# Patient Record
Sex: FEMALE | Race: WHITE | Employment: OTHER | URBAN - METROPOLITAN AREA
[De-identification: names, ages, dates, MRNs, and addresses within clinical notes are randomized per-mention and may not be internally consistent; named-entity substitution may affect disease eponyms.]

---

## 2018-09-30 ENCOUNTER — HOSPITAL ENCOUNTER (EMERGENCY)
Age: 58
Discharge: HOME | End: 2018-09-30
Attending: EMERGENCY MEDICINE
Payer: COMMERCIAL

## 2018-09-30 ENCOUNTER — APPOINTMENT (OUTPATIENT)
Dept: GENERAL RADIOLOGY | Age: 58
End: 2018-09-30
Attending: EMERGENCY MEDICINE
Payer: COMMERCIAL

## 2018-09-30 VITALS
WEIGHT: 190.26 LBS | SYSTOLIC BLOOD PRESSURE: 141 MMHG | OXYGEN SATURATION: 97 % | TEMPERATURE: 98.4 F | BODY MASS INDEX: 35.92 KG/M2 | RESPIRATION RATE: 20 BRPM | HEIGHT: 61 IN | HEART RATE: 85 BPM | DIASTOLIC BLOOD PRESSURE: 79 MMHG

## 2018-09-30 DIAGNOSIS — M54.41 ACUTE RIGHT-SIDED LOW BACK PAIN WITH RIGHT-SIDED SCIATICA: Primary | ICD-10-CM

## 2018-09-30 DIAGNOSIS — M62.838 MUSCLE SPASM: ICD-10-CM

## 2018-09-30 PROCEDURE — 72100 X-RAY EXAM L-S SPINE 2/3 VWS: CPT

## 2018-09-30 PROCEDURE — 99283 EMERGENCY DEPT VISIT LOW MDM: CPT

## 2018-09-30 PROCEDURE — 96372 THER/PROPH/DIAG INJ SC/IM: CPT

## 2018-09-30 PROCEDURE — 74011250636 HC RX REV CODE- 250/636: Performed by: EMERGENCY MEDICINE

## 2018-09-30 RX ORDER — KETOROLAC TROMETHAMINE 30 MG/ML
30 INJECTION, SOLUTION INTRAMUSCULAR; INTRAVENOUS
Status: COMPLETED | OUTPATIENT
Start: 2018-09-30 | End: 2018-09-30

## 2018-09-30 RX ORDER — SIMVASTATIN 20 MG/1
20 TABLET, FILM COATED ORAL
COMMUNITY
End: 2019-08-19 | Stop reason: SDUPTHER

## 2018-09-30 RX ORDER — TRAMADOL HYDROCHLORIDE 50 MG/1
50 TABLET ORAL
COMMUNITY
End: 2018-09-30

## 2018-09-30 RX ORDER — CYCLOBENZAPRINE HCL 10 MG
10 TABLET ORAL
Qty: 15 TAB | Refills: 0 | Status: SHIPPED | OUTPATIENT
Start: 2018-09-30 | End: 2019-04-17 | Stop reason: ALTCHOICE

## 2018-09-30 RX ORDER — IBUPROFEN 200 MG
1 TABLET ORAL EVERY 24 HOURS
COMMUNITY

## 2018-09-30 RX ORDER — METFORMIN HYDROCHLORIDE 750 MG/1
1500 TABLET, EXTENDED RELEASE ORAL DAILY
COMMUNITY
End: 2019-11-18 | Stop reason: SDUPTHER

## 2018-09-30 RX ADMIN — KETOROLAC TROMETHAMINE 30 MG: 30 INJECTION, SOLUTION INTRAMUSCULAR at 17:26

## 2018-09-30 NOTE — DISCHARGE INSTRUCTIONS
Back Pain: Care Instructions  Your Care Instructions    Back pain has many possible causes. It is often related to problems with muscles and ligaments of the back. It may also be related to problems with the nerves, discs, or bones of the back. Moving, lifting, standing, sitting, or sleeping in an awkward way can strain the back. Sometimes you don't notice the injury until later. Arthritis is another common cause of back pain. Although it may hurt a lot, back pain usually improves on its own within several weeks. Most people recover in 12 weeks or less. Using good home treatment and being careful not to stress your back can help you feel better sooner. Follow-up care is a key part of your treatment and safety. Be sure to make and go to all appointments, and call your doctor if you are having problems. It's also a good idea to know your test results and keep a list of the medicines you take. How can you care for yourself at home? · Sit or lie in positions that are most comfortable and reduce your pain. Try one of these positions when you lie down:  ¨ Lie on your back with your knees bent and supported by large pillows. ¨ Lie on the floor with your legs on the seat of a sofa or chair. Tracy Sandhoff on your side with your knees and hips bent and a pillow between your legs. ¨ Lie on your stomach if it does not make pain worse. · Do not sit up in bed, and avoid soft couches and twisted positions. Bed rest can help relieve pain at first, but it delays healing. Avoid bed rest after the first day of back pain. · Change positions every 30 minutes. If you must sit for long periods of time, take breaks from sitting. Get up and walk around, or lie in a comfortable position. · Try using a heating pad on a low or medium setting for 15 to 20 minutes every 2 or 3 hours. Try a warm shower in place of one session with the heating pad. · You can also try an ice pack for 10 to 15 minutes every 2 to 3 hours.  Put a thin cloth between the ice pack and your skin. · Take pain medicines exactly as directed. ¨ If the doctor gave you a prescription medicine for pain, take it as prescribed. ¨ If you are not taking a prescription pain medicine, ask your doctor if you can take an over-the-counter medicine. · Take short walks several times a day. You can start with 5 to 10 minutes, 3 or 4 times a day, and work up to longer walks. Walk on level surfaces and avoid hills and stairs until your back is better. · Return to work and other activities as soon as you can. Continued rest without activity is usually not good for your back. · To prevent future back pain, do exercises to stretch and strengthen your back and stomach. Learn how to use good posture, safe lifting techniques, and proper body mechanics. When should you call for help? Call your doctor now or seek immediate medical care if:    · You have new or worsening numbness in your legs.     · You have new or worsening weakness in your legs. (This could make it hard to stand up.)     · You lose control of your bladder or bowels.    Watch closely for changes in your health, and be sure to contact your doctor if:    · You have a fever, lose weight, or don't feel well.     · You do not get better as expected. Where can you learn more? Go to http://gopi-felicia.info/. Enter C465 in the search box to learn more about \"Back Pain: Care Instructions. \"  Current as of: November 29, 2017  Content Version: 11.7  © 1040-8123 [employer]. Care instructions adapted under license by [employer] (which disclaims liability or warranty for this information). If you have questions about a medical condition or this instruction, always ask your healthcare professional. [place] [address] any warranty or liability for your use of this information.

## 2018-09-30 NOTE — ED PROVIDER NOTES
HPI Comments: Pt. Presents to the ER with complaints of back pain. Pt. Has a history of recurrent back pain. Pt. Lifted a heavy bag of grass seed three days ago. Shortly afterwards, pt. Developed right sided low back pain that radiates down her right leg. Pt. Has taken ultram with minimal relief. Pt. Denies numbness/tingling or incontinence of urine or stool. A few times, pt. Was unable to get to the bathroom in time and urinated on herself. However, she states that this was because her back was spasming and she wasn't able to walk well. Pt. Says that she is pain free at rest, but her back spasms with minimal movement. No falls. No trauma. No other complaints. Patient is a 60 y.o. female presenting with back pain. Back Pain Pertinent negatives include no chest pain, no fever, no abdominal pain, no dysuria and no weakness. Past Medical History:  
Diagnosis Date  Asthma  Reyes's esophagus  Diabetes ([facility] [address]  Macular degeneration, right eye  Psychiatric disorder   
 depression Past Surgical History:  
Procedure Laterality Date  HX CHOLECYSTECTOMY  HX HEENT    
 tonsils History reviewed. No pertinent family history. Social History Social History  Marital status: N/A Spouse name: N/A  
 Number of children: N/A  
 Years of education: N/A Occupational History  Not on file. Social History Main Topics  Smoking status: Former Smoker  Smokeless tobacco: Never Used  Alcohol use Yes Comment: occ  Drug use: No  
 Sexual activity: Not on file Other Topics Concern  Not on file Social History Narrative  No narrative on file ALLERGIES: Vioxx [rofecoxib] Review of Systems Constitutional: Negative for chills and fever. HENT: Negative for rhinorrhea and sore throat. Respiratory: Negative for cough and shortness of breath. Cardiovascular: Negative for chest pain. Gastrointestinal: Negative for abdominal pain, diarrhea, nausea and vomiting. Genitourinary: Negative for dysuria and urgency. Musculoskeletal: Positive for back pain. Negative for arthralgias. Skin: Negative for rash. Neurological: Negative for dizziness, weakness and light-headedness. Vitals:  
 09/30/18 1708 BP: 141/79 Pulse: 85 Resp: 20 Temp: 98.4 °F (36.9 °C) SpO2: 97% Weight: 86.3 kg (190 lb 4.1 oz) Height: 5' 1\" (1.549 m) Physical Exam  
 
Vital signs reviewed. Nursing notes reviewed. Const:  No acute distress, well developed, well nourished Head:  Atraumatic, normocephalic Eyes:  PERRL, conjunctiva normal, no scleral icterus Neck:  Supple, trachea midline Cardiovascular:  RRR, no murmurs, no gallops, no rubs Resp:  No resp distress, no increased work of breathing, no wheezes, no rhonchi, no rales, Abd:  Soft, non-tender, non-distended, no rebound, no guarding, no CVA tenderness :  Deferred MSK:  No pedal edema, no midline C, T or L spine tenderness, positive straight leg raise on the right, lifting the left leg causes worse pain in her back Neuro:  Alert and oriented x3, no cranial nerve defect Skin:  Warm, dry, intact Psych: normal mood and affect, behavior is normal, judgement and thought content is normal 
 
 
 
MDM Number of Diagnoses or Management Options Acute right-sided low back pain with right-sided sciatica:  
Muscle spasm:  
  
Amount and/or Complexity of Data Reviewed Tests in the radiology section of CPT®: ordered and reviewed Review and summarize past medical records: yes Patient Progress Patient progress: stable ED Course Pt. Presents to the ER with complaints of low back pain/spasms. No signs/sx of cord compression. No fx on xray. I will start her on flexeril. Pt. To f/u with her PCP or return to the ER with worsening sx. Procedures

## 2018-09-30 NOTE — ED TRIAGE NOTES
Pt. States her back started hurting on Friday but today is the worse. PT. Has hx. Of back issues and \"goes out on her every now and then\".

## 2019-04-17 ENCOUNTER — OFFICE VISIT (OUTPATIENT)
Dept: PRIMARY CARE CLINIC | Age: 59
End: 2019-04-17

## 2019-04-17 VITALS
HEART RATE: 89 BPM | WEIGHT: 175.6 LBS | DIASTOLIC BLOOD PRESSURE: 86 MMHG | OXYGEN SATURATION: 99 % | BODY MASS INDEX: 33.15 KG/M2 | TEMPERATURE: 98.5 F | RESPIRATION RATE: 16 BRPM | SYSTOLIC BLOOD PRESSURE: 129 MMHG | HEIGHT: 61 IN

## 2019-04-17 DIAGNOSIS — M54.9 OTHER ACUTE BACK PAIN: ICD-10-CM

## 2019-04-17 DIAGNOSIS — D64.9 ANEMIA, UNSPECIFIED TYPE: ICD-10-CM

## 2019-04-17 DIAGNOSIS — F32.1 CURRENT MODERATE EPISODE OF MAJOR DEPRESSIVE DISORDER, UNSPECIFIED WHETHER RECURRENT (HCC): ICD-10-CM

## 2019-04-17 DIAGNOSIS — K22.70 BARRETT'S ESOPHAGUS WITHOUT DYSPLASIA: ICD-10-CM

## 2019-04-17 DIAGNOSIS — G62.9 NEUROPATHY: ICD-10-CM

## 2019-04-17 DIAGNOSIS — E11.9 CONTROLLED TYPE 2 DIABETES MELLITUS WITHOUT COMPLICATION, WITHOUT LONG-TERM CURRENT USE OF INSULIN (HCC): Primary | ICD-10-CM

## 2019-04-17 DIAGNOSIS — E53.8 VITAMIN B12 DEFICIENCY: ICD-10-CM

## 2019-04-17 DIAGNOSIS — F43.10 PTSD (POST-TRAUMATIC STRESS DISORDER): ICD-10-CM

## 2019-04-17 DIAGNOSIS — B00.9 HERPES SIMPLEX TYPE 1 INFECTION: ICD-10-CM

## 2019-04-17 DIAGNOSIS — F51.01 PRIMARY INSOMNIA: ICD-10-CM

## 2019-04-17 DIAGNOSIS — E78.00 HYPERCHOLESTEREMIA: ICD-10-CM

## 2019-04-17 DIAGNOSIS — E11.9 TYPE 2 DIABETES MELLITUS WITHOUT COMPLICATION, WITHOUT LONG-TERM CURRENT USE OF INSULIN (HCC): ICD-10-CM

## 2019-04-17 LAB — HBA1C MFR BLD HPLC: 8.7 %

## 2019-04-17 RX ORDER — ALBUTEROL SULFATE 90 UG/1
AEROSOL, METERED RESPIRATORY (INHALATION)
Refills: 2 | COMMUNITY
Start: 2019-04-03 | End: 2019-11-25 | Stop reason: SDUPTHER

## 2019-04-17 RX ORDER — ESCITALOPRAM OXALATE 20 MG/1
TABLET ORAL
COMMUNITY
End: 2019-04-17

## 2019-04-17 RX ORDER — TRAZODONE HYDROCHLORIDE 50 MG/1
TABLET ORAL
Refills: 0 | COMMUNITY
Start: 2019-03-26 | End: 2019-08-19 | Stop reason: SDUPTHER

## 2019-04-17 RX ORDER — METHYLPHENIDATE HYDROCHLORIDE 20 MG/1
TABLET ORAL
Refills: 0 | COMMUNITY
Start: 2019-02-14 | End: 2019-06-24 | Stop reason: DRUGHIGH

## 2019-04-17 RX ORDER — ESOMEPRAZOLE MAGNESIUM 40 MG/1
CAPSULE, DELAYED RELEASE ORAL
Refills: 3 | COMMUNITY
Start: 2019-03-18

## 2019-04-17 RX ORDER — CYANOCOBALAMIN 1000 UG/ML
INJECTION, SOLUTION INTRAMUSCULAR; SUBCUTANEOUS
Refills: 0 | COMMUNITY
Start: 2019-02-11 | End: 2019-07-03 | Stop reason: SDUPTHER

## 2019-04-17 RX ORDER — CYCLOBENZAPRINE HCL 10 MG
10 TABLET ORAL
Qty: 30 TAB | Refills: 0 | Status: SHIPPED | OUTPATIENT
Start: 2019-04-17 | End: 2019-04-22 | Stop reason: SDUPTHER

## 2019-04-17 RX ORDER — VALACYCLOVIR HYDROCHLORIDE 500 MG/1
TABLET, FILM COATED ORAL AS NEEDED
Refills: 3 | COMMUNITY
Start: 2019-03-14

## 2019-04-17 RX ORDER — DULOXETIN HYDROCHLORIDE 60 MG/1
60 CAPSULE, DELAYED RELEASE ORAL DAILY
Qty: 30 CAP | Refills: 3 | Status: SHIPPED | OUTPATIENT
Start: 2019-04-17 | End: 2019-08-19

## 2019-04-17 RX ORDER — PROGESTERONE 200 MG/1
CAPSULE ORAL
Refills: 3 | COMMUNITY
Start: 2019-02-05 | End: 2019-10-31 | Stop reason: ALTCHOICE

## 2019-04-17 RX ORDER — NEEDLES, FILTER 19GX1 1/2"
NEEDLE, DISPOSABLE MISCELLANEOUS
Refills: 3 | COMMUNITY
Start: 2019-02-11

## 2019-04-17 NOTE — PROGRESS NOTES
Chief Complaint Patient presents with [facility]  Diabetes  
  patient states she thinks she is getting neuropathy in both of her feet, has cold feet on and off, numbness, tingly, and sharp pricks in her feet. Diabetes runs in her family and  her sister got BI AKA 1. Have you been to the ER, urgent care clinic since your last visit? Hospitalized since your last visit? No 
 
2. Have you seen or consulted any other health care providers outside of the [address] [place] [address] since your last visit? Include any pap smears or colon screening.  No
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
## 2019-04-18 LAB
ALBUMIN SERPL-MCNC: 4.5 G/DL (ref 3.5–5.5)
ALBUMIN/CREAT UR: 6.5 MG/G CREAT (ref 0–30)
ALBUMIN/GLOB SERPL: 1.8 {RATIO} (ref 1.2–2.2)
ALP SERPL-CCNC: 127 IU/L (ref 39–117)
ALT SERPL-CCNC: 27 IU/L (ref 0–32)
APPEARANCE UR: ABNORMAL
AST SERPL-CCNC: 14 IU/L (ref 0–40)
BASOPHILS # BLD AUTO: 0 X10E3/UL (ref 0–0.2)
BASOPHILS NFR BLD AUTO: 0 %
BILIRUB SERPL-MCNC: 0.2 MG/DL (ref 0–1.2)
BILIRUB UR QL STRIP: NEGATIVE
BUN SERPL-MCNC: 12 MG/DL (ref 6–24)
BUN/CREAT SERPL: 20 (ref 9–23)
CALCIUM SERPL-MCNC: 8.8 MG/DL (ref 8.7–10.2)
CHLORIDE SERPL-SCNC: 103 MMOL/L (ref 96–106)
CHOLEST SERPL-MCNC: 146 MG/DL (ref 100–199)
CO2 SERPL-SCNC: 17 MMOL/L (ref 20–29)
COLOR UR: YELLOW
CREAT SERPL-MCNC: 0.59 MG/DL (ref 0.57–1)
CREAT UR-MCNC: 102.3 MG/DL
EOSINOPHIL # BLD AUTO: 0.4 X10E3/UL (ref 0–0.4)
EOSINOPHIL NFR BLD AUTO: 4 %
ERYTHROCYTE [DISTWIDTH] IN BLOOD BY AUTOMATED COUNT: 13.8 % (ref 12.3–15.4)
FERRITIN SERPL-MCNC: 27 NG/ML (ref 15–150)
FOLATE SERPL-MCNC: >20 NG/ML
GLOBULIN SER CALC-MCNC: 2.5 G/DL (ref 1.5–4.5)
GLUCOSE SERPL-MCNC: 183 MG/DL (ref 65–99)
GLUCOSE UR QL: ABNORMAL
HCT VFR BLD AUTO: 46.3 % (ref 34–46.6)
HDLC SERPL-MCNC: 34 MG/DL
HGB BLD-MCNC: 15.6 G/DL (ref 11.1–15.9)
HGB UR QL STRIP: NEGATIVE
IMM GRANULOCYTES # BLD AUTO: 0.1 X10E3/UL (ref 0–0.1)
IMM GRANULOCYTES NFR BLD AUTO: 1 %
IRON SATN MFR SERPL: 18 % (ref 15–55)
IRON SERPL-MCNC: 63 UG/DL (ref 27–159)
KETONES UR QL STRIP: NEGATIVE
LDLC SERPL CALC-MCNC: 86 MG/DL (ref 0–99)
LEUKOCYTE ESTERASE UR QL STRIP: NEGATIVE
LYMPHOCYTES # BLD AUTO: 2.3 X10E3/UL (ref 0.7–3.1)
LYMPHOCYTES NFR BLD AUTO: 27 %
MCH RBC QN AUTO: 30.1 PG (ref 26.6–33)
MCHC RBC AUTO-ENTMCNC: 33.7 G/DL (ref 31.5–35.7)
MCV RBC AUTO: 89 FL (ref 79–97)
MICRO URNS: ABNORMAL
MICROALBUMIN UR-MCNC: 6.6 UG/ML
MONOCYTES # BLD AUTO: 0.5 X10E3/UL (ref 0.1–0.9)
MONOCYTES NFR BLD AUTO: 6 %
NEUTROPHILS # BLD AUTO: 5.5 X10E3/UL (ref 1.4–7)
NEUTROPHILS NFR BLD AUTO: 62 %
NITRITE UR QL STRIP: NEGATIVE
PH UR STRIP: 5.5 [PH] (ref 5–7.5)
PLATELET # BLD AUTO: 253 X10E3/UL (ref 150–379)
POTASSIUM SERPL-SCNC: 4.3 MMOL/L (ref 3.5–5.2)
PROT SERPL-MCNC: 7 G/DL (ref 6–8.5)
PROT UR QL STRIP: NEGATIVE
RBC # BLD AUTO: 5.19 X10E6/UL (ref 3.77–5.28)
SODIUM SERPL-SCNC: 137 MMOL/L (ref 134–144)
SP GR UR: >=1.03 (ref 1–1.03)
TIBC SERPL-MCNC: 356 UG/DL (ref 250–450)
TRIGL SERPL-MCNC: 128 MG/DL (ref 0–149)
TSH SERPL DL<=0.005 MIU/L-ACNC: 2.07 UIU/ML (ref 0.45–4.5)
UIBC SERPL-MCNC: 293 UG/DL (ref 131–425)
UROBILINOGEN UR STRIP-MCNC: 0.2 MG/DL (ref 0.2–1)
VIT B12 SERPL-MCNC: 835 PG/ML (ref 232–1245)
VLDLC SERPL CALC-MCNC: 26 MG/DL (ref 5–40)
WBC # BLD AUTO: 8.8 X10E3/UL (ref 3.4–10.8)

## 2019-04-19 DIAGNOSIS — M54.9 OTHER ACUTE BACK PAIN: ICD-10-CM

## 2019-04-19 DIAGNOSIS — E11.9 TYPE 2 DIABETES MELLITUS WITHOUT COMPLICATION, WITHOUT LONG-TERM CURRENT USE OF INSULIN (HCC): ICD-10-CM

## 2019-04-19 NOTE — TELEPHONE ENCOUNTER
Pt came in and trulicity was supposed to be upped in dose but instead the rx was written for the normal dose. Would like this changed and sent to the pharmacy.

## 2019-04-22 RX ORDER — CYCLOBENZAPRINE HCL 10 MG
10 TABLET ORAL
Qty: 30 TAB | Refills: 0 | Status: SHIPPED | OUTPATIENT
Start: 2019-04-22 | End: 2019-04-29 | Stop reason: SDUPTHER

## 2019-04-22 NOTE — TELEPHONE ENCOUNTER
Called the pharmacist at [facility], he said the insurance is asking that we go to [facility] if possible, as it will cost the patient less money when she fills it. Her Trulicity is able to be filled but it will just be more expensive. Let me know what you would like to do Dr. Tracy Wen and I will call the pharmacist back and let him know.

## 2019-04-22 NOTE — TELEPHONE ENCOUNTER
Called and spoke with patient. Patient is stating that insurance is saying it is too soon to fill her prescription. I let patient know I would call the pharmacy and see what was going on. Patient thanked me for the call.

## 2019-04-22 NOTE — TELEPHONE ENCOUNTER
victoza is once daily inj. Pt has option of Trulcitiy, Ozempic or Bydureon once weekly. We can check with pharmacy if of of them is covered and how much it would cost more. I dont think [facility] is a good option.

## 2019-04-22 NOTE — TELEPHONE ENCOUNTER
I called and spoke to patient who states she has been on the Trulicity for 2 years and likes it so she would prefer to stay on it. Patient is not happy with [facility] so she would like these medications to go to [facility] instead of [facility]. I have pend them over to you.

## 2019-04-22 NOTE — TELEPHONE ENCOUNTER
When I spoke with him he did say that [address] were also on the list but he suggested Victoza. Would you like to try either one of those?

## 2019-04-26 ENCOUNTER — OFFICE VISIT (OUTPATIENT)
Dept: PRIMARY CARE CLINIC | Age: 59
End: 2019-04-26

## 2019-04-26 VITALS
DIASTOLIC BLOOD PRESSURE: 86 MMHG | HEART RATE: 90 BPM | RESPIRATION RATE: 17 BRPM | WEIGHT: 176 LBS | TEMPERATURE: 98.5 F | BODY MASS INDEX: 33.23 KG/M2 | SYSTOLIC BLOOD PRESSURE: 135 MMHG | OXYGEN SATURATION: 98 % | HEIGHT: 61 IN

## 2019-04-26 DIAGNOSIS — E11.9 TYPE 2 DIABETES MELLITUS WITHOUT COMPLICATION, WITHOUT LONG-TERM CURRENT USE OF INSULIN (HCC): ICD-10-CM

## 2019-04-26 DIAGNOSIS — G89.29 CHRONIC SI JOINT PAIN: Primary | ICD-10-CM

## 2019-04-26 DIAGNOSIS — M25.551 PAIN OF RIGHT HIP JOINT: ICD-10-CM

## 2019-04-26 DIAGNOSIS — M53.3 CHRONIC SI JOINT PAIN: Primary | ICD-10-CM

## 2019-04-26 RX ORDER — TRAMADOL HYDROCHLORIDE 50 MG/1
25 TABLET ORAL
COMMUNITY

## 2019-04-26 RX ORDER — IBUPROFEN 800 MG/1
800 TABLET ORAL
Qty: 30 TAB | Refills: 2 | Status: SHIPPED | OUTPATIENT
Start: 2019-04-26 | End: 2019-12-16 | Stop reason: SDUPTHER

## 2019-04-26 RX ORDER — CYCLOBENZAPRINE HCL 10 MG
10 TABLET ORAL
Qty: 30 TAB | Refills: 1 | Status: SHIPPED | OUTPATIENT
Start: 2019-04-26 | End: 2019-10-31 | Stop reason: ALTCHOICE

## 2019-04-26 NOTE — PROGRESS NOTES
Mario Partida is a 60 y.o.  female and presents with Chief Complaint Patient presents with  Back Pain  
  x 3 wks, getting worst  
 Diabetes Patient is here as a walk-in for back pain that started 3 weeks ago after she did some yard work. She thought that the pain was getting better but it seems to be coming back. She says she had similar pain in the lower back in September when she had to be brought into the ER. By ambulance. The pain is located more on the right side of the lower back also inner aspect of her hip hurts on the right side. She took the Flexeril and it helps her some. She does not want to take any opioids as it causes side effects. Lore Poe She is using a cane. She has a sleep number bed at home and she says she increase the pressure to 80 to make it firm. Past Medical History:  
Diagnosis Date  Asthma  Reyes's esophagus  Diabetes ([address]  Macular degeneration, right eye  Psychiatric disorder   
 depression Past Surgical History:  
Procedure Laterality Date  HX CHOLECYSTECTOMY  HX HEENT    
 tonsils Current Outpatient Medications Medication Sig  
 traMADol (ULTRAM) 50 mg tablet Take 50 mg by mouth every six (6) hours as needed for Pain.  ibuprofen (MOTRIN) 800 mg tablet Take 1 Tab by mouth every eight (8) hours as needed for Pain.  cyclobenzaprine (FLEXERIL) 10 mg tablet Take 1 Tab by mouth three (3) times daily as needed for Muscle Spasm(s).  dulaglutide (TRULICITY) 1.5 JK/0.4 mL sub-q pen 0.5 mL by SubCUTAneous route every seven (7) days.  cyclobenzaprine (FLEXERIL) 10 mg tablet Take 1 Tab by mouth three (3) times daily as needed for Muscle Spasm(s).  linagliptin (TRADJENTA) 5 mg tablet Take 1 Tab by mouth daily.   
 traZODone (DESYREL) 50 mg tablet TAKE 1/2 TABLET BY MOUTH EVERY DAY AT BEDTIME  
 NEXIUM 40 mg capsule TAKE 1 CAPSULE BY MOUTH TWICE A DAY  
  cyanocobalamin (VITAMIN B12) 1,000 mcg/mL injection INJECT 1 ML INTRAMUSCULARRLY EVERY WEEK  
 methylphenidate HCl (RITALIN) 20 mg tablet TAKE 1 TABLET BY MOUTH THREE TIMES A DAY  TESTOSTERONE, BULK, testosterone  VENTOLIN HFA 90 mcg/actuation inhaler INHALE 2 PUFFS EVERY 4 HOURS AS NEEDED FOR WHEEZING  progesterone (PROMETRIUM) 200 mg capsule TAKE 1 CAPSULE (200 MG) BY ORAL ROUTE ONCE DAILY IN THE EVENING  
 valACYclovir (VALTREX) 500 mg tablet TAKE 1 TABLET BY MOUTH THREE TIMES A DAY  DULoxetine (CYMBALTA) 60 mg capsule Take 1 Cap by mouth daily.  simvastatin (ZOCOR) 20 mg tablet Take 20 mg by mouth nightly.  metFORMIN ER (GLUCOPHAGE XR) 750 mg tablet Take 750 mg by mouth daily. 3 tabs once a day  BD INTEGRA SYRINGE 3 mL 25 gauge x 1\" syrg USE WITH VITAMIN B12 INJECTION WEEKLY  nicotine (NICODERM CQ) 21 mg/24 hr 1 Patch by TransDERmal route every twenty-four (24) hours. No current facility-administered medications for this visit. Health Maintenance Topic Date Due  
 Hepatitis C Screening  1960  Pneumococcal 0-64 years (1 of 1 - PPSV23) 09/01/1965  
 FOOT EXAM Q1  09/01/1969  
 EYE EXAM RETINAL OR DILATED  09/01/1969  
 DTaP/Tdap/Td series (1 - Tdap) 09/01/1980  Shingrix Vaccine Age 50> (1 of 2) 09/01/2009  FOBT Q 1 YEAR AGE 50-75  09/01/2009  Influenza Age 5 to Adult  08/01/2019  
 HEMOGLOBIN A1C Q6M  10/17/2019  MICROALBUMIN Q1  04/17/2020  LIPID PANEL Q1  04/17/2020  BREAST CANCER SCRN MAMMOGRAM  03/05/2021  PAP AKA CERVICAL CYTOLOGY  01/23/2022 There is no immunization history on file for this patient. No LMP recorded. Patient is postmenopausal. 
 
 
 
Allergies and Intolerances: Allergies Allergen Reactions  Vioxx [Rofecoxib] Other (comments) Family History:  
History reviewed. No pertinent family history. Social History: She  reports that she has quit smoking.  She has never used smokeless tobacco.  She  reports that she drinks alcohol. Review of Systems:  
General: negative for - chills, fatigue, fever, weight change Psych: negative for - anxiety, depression, irritability or mood swings ENT: negative for - headaches, hearing change, nasal congestion, oral lesions, sneezing or sore throat Heme/ Lymph: negative for - bleeding problems, bruising, pallor or swollen lymph nodes Endo: negative for - hot flashes, polydipsia/polyuria or temperature intolerance Resp: negative for - cough, shortness of breath or wheezing CV: negative for - chest pain, edema or palpitations GI: negative for - abdominal pain, change in bowel habits, constipation, diarrhea or nausea/vomiting : negative for - dysuria, hematuria, incontinence, pelvic pain or vulvar/vaginal symptoms MSK: negative for - joint pain, joint swelling or muscle pain, positive for lower back pain Neuro: negative for - confusion, headaches, seizures or weakness Derm: negative for - dry skin, hair changes, rash or skin lesion changes Physical:  
Vitals:  
Vitals:  
 04/26/19 1405 BP: 135/86 Pulse: 90 Resp: 17 Temp: 98.5 °F (36.9 °C) TempSrc: Oral  
SpO2: 98% Weight: 176 lb (79.8 kg) Height: 5' 1\" (1.549 m) Exam:  
HEENT- atraumatic,normocephalic, awake, oriented, well nourished Neck - supple,no enlarged lymph nodes, no JVD, no thyromegaly Chest- CTA, no rhonchi, no crackles Heart- rrr, no murmurs / gallop/rub Abdomen- soft,BS+,NT, no hepatosplenomegaly Ext - no c/c/edema Neuro- no focal deficits. Power 5/5 all extremities Skin - warm,dry, no obvious rashes. Back -positive for tenderness over the right sacroiliac joint, also pain elicited on range of motion at the right hip, on external rotation and abduction. Review of Data:  
LABS:  
Lab Results Component Value Date/Time  WBC 8.8 04/17/2019 02:13 PM  
 HGB 15.6 04/17/2019 02:13 PM  
 HCT 46.3 04/17/2019 02:13 PM  
 PLATELET 185 82/79/3058 02:13 PM  
 
Lab Results Component Value Date/Time Sodium 137 04/17/2019 02:13 PM  
 Potassium 4.3 04/17/2019 02:13 PM  
 Chloride 103 04/17/2019 02:13 PM  
 CO2 17 (L) 04/17/2019 02:13 PM  
 Glucose 183 (H) 04/17/2019 02:13 PM  
 BUN 12 04/17/2019 02:13 PM  
 Creatinine 0.59 04/17/2019 02:13 PM  
 
Lab Results Component Value Date/Time Cholesterol, total 146 04/17/2019 02:13 PM  
 HDL Cholesterol 34 (L) 04/17/2019 02:13 PM  
 LDL, calculated 86 04/17/2019 02:13 PM  
 Triglyceride 128 04/17/2019 02:13 PM  
 
No results found for: GPT Impression / Plan: ICD-10-CM ICD-9-CM 1. Chronic SI joint pain M53.3 724.6 XR SI JTS MAX 2 V  
 G89.29 338.29 ibuprofen (MOTRIN) 800 mg tablet  
   cyclobenzaprine (FLEXERIL) 10 mg tablet 2. Type 2 diabetes mellitus without complication, without long-term current use of insulin (HCC) E11.9 250.00 dulaglutide (TRULICITY) 1.5 WO/7.0 mL sub-q pen 3. Pain of right hip joint M25.551 719.45 XR HIP RT W OR WO PELV 2-3 VWS  
   ibuprofen (MOTRIN) 800 mg tablet  
   cyclobenzaprine (FLEXERIL) 10 mg tablet Patient thanked me for seeing her today as a walk-in. Explained to patient risk benefits of the medications. Advised patient to stop meds if having any side effects. Pt verbalized understanding of the instructions. I have discussed the diagnosis with the patient and the intended plan as seen in the above orders. The patient has received an after-visit summary and questions were answered concerning future plans. I have discussed medication side effects and warnings with the patient as well. I have reviewed the plan of care with the patient, accepted their input and they are in agreement with the treatment goals. Reviewed plan of care. Patient has provided input and agrees with goals. Follow-up and Dispositions · Return if symptoms worsen or fail to improve.  
  
 
 
Anastacia Lambert MD

## 2019-04-29 ENCOUNTER — TELEPHONE (OUTPATIENT)
Dept: PRIMARY CARE CLINIC | Age: 59
End: 2019-04-29

## 2019-04-29 DIAGNOSIS — E11.9 TYPE 2 DIABETES MELLITUS WITHOUT COMPLICATION, WITHOUT LONG-TERM CURRENT USE OF INSULIN (HCC): ICD-10-CM

## 2019-04-29 NOTE — TELEPHONE ENCOUNTER
Trulicity 1.5 mg /weekly is the max dose. It cannot be increased any further. If I remember correct pt had declined insulin.

## 2019-04-29 NOTE — TELEPHONE ENCOUNTER
Patient is on trulicity. Stated to pharmacist that the dosage is wrong and someone has made a mistake    Pharmacist pulled last 3 rx and all doses are the same.  And patient is insisting that its incorrect

## 2019-04-29 NOTE — TELEPHONE ENCOUNTER
Trulicity 2.4HS/5.0 ml is an issue at the pharmacy. PT. States that Nazario increased dosage and pharmacy says he did not - please call "[facility]" to verify @ [phone].

## 2019-04-29 NOTE — TELEPHONE ENCOUNTER
Pt used to be on Trulicity 9.47 mg once weekly. I increased the dose to 1.5 mg / weekly. If pharmacist has dispensed Trulicity 1/5 mg weekly then that is the correct dose and we can inform pt that she is taking the correct dose. If pharmacist informs that they have dispensed 0.75 mg weekly then we can ask pharmacy as to why she is getting the lower dose.

## 2019-04-29 NOTE — TELEPHONE ENCOUNTER
Prescription for the increase was not sent to the pharmacy. IT was printed, so pharmacy did not have new prescription on file. Prescription needs to be resent.

## 2019-04-29 NOTE — TELEPHONE ENCOUNTER
Patient states that she told  the wrong dose when she was here. States that she was already taking 1.5 mg trulicity per week and not 0.75 mg.

## 2019-05-01 ENCOUNTER — TELEPHONE (OUTPATIENT)
Dept: PRIMARY CARE CLINIC | Age: 59
End: 2019-05-01

## 2019-05-01 DIAGNOSIS — E11.9 TYPE 2 DIABETES MELLITUS WITHOUT COMPLICATION, WITHOUT LONG-TERM CURRENT USE OF INSULIN (HCC): Primary | ICD-10-CM

## 2019-05-01 RX ORDER — INSULIN GLARGINE 100 [IU]/ML
INJECTION, SOLUTION SUBCUTANEOUS
Qty: 5 PEN | Refills: 3 | Status: SHIPPED | OUTPATIENT
Start: 2019-05-01 | End: 2019-05-03 | Stop reason: SDUPTHER

## 2019-05-03 ENCOUNTER — TELEPHONE (OUTPATIENT)
Dept: PRIMARY CARE CLINIC | Age: 59
End: 2019-05-03

## 2019-05-03 ENCOUNTER — HOSPITAL ENCOUNTER (OUTPATIENT)
Dept: GENERAL RADIOLOGY | Age: 59
Discharge: HOME | End: 2019-05-03
Payer: COMMERCIAL

## 2019-05-03 DIAGNOSIS — M25.551 PAIN OF RIGHT HIP JOINT: ICD-10-CM

## 2019-05-03 DIAGNOSIS — G89.29 CHRONIC SI JOINT PAIN: ICD-10-CM

## 2019-05-03 DIAGNOSIS — E11.9 TYPE 2 DIABETES MELLITUS WITHOUT COMPLICATION, WITHOUT LONG-TERM CURRENT USE OF INSULIN (HCC): ICD-10-CM

## 2019-05-03 DIAGNOSIS — M53.3 CHRONIC SI JOINT PAIN: ICD-10-CM

## 2019-05-03 PROCEDURE — 73502 X-RAY EXAM HIP UNI 2-3 VIEWS: CPT

## 2019-05-03 PROCEDURE — 72200 X-RAY EXAM SI JOINTS: CPT

## 2019-05-03 RX ORDER — INSULIN GLARGINE 100 [IU]/ML
INJECTION, SOLUTION SUBCUTANEOUS
Qty: 5 PEN | Refills: 3 | Status: SHIPPED | OUTPATIENT
Start: 2019-05-03 | End: 2019-08-19 | Stop reason: SDUPTHER

## 2019-05-03 NOTE — TELEPHONE ENCOUNTER
Insurance denied PA for lantus, pt has to try and fail basaglar or levemir first. Pt will go ahead and get the basaglar which covered by insurance but does not want to go to [facility] anymore, wants prescription sent to [facility]. Prescription sent as requested.

## 2019-05-03 NOTE — TELEPHONE ENCOUNTER
Lantus not covered by patient's insurance. Insurance is requesting basaglar or levemir. Pt has a savings card for lantus so if prior authorization is approved through insurance, she will get the lantus for $0 co pay. Per pharmacist, pt wants to do the prior auth so she can get the lantus for free. PA done, will takes up to 72 hours for insurance determination. Will inform patient once we hear back from insurance.

## 2019-06-02 ENCOUNTER — HOSPITAL ENCOUNTER (EMERGENCY)
Age: 59
Discharge: HOME | End: 2019-06-02
Attending: STUDENT IN AN ORGANIZED HEALTH CARE EDUCATION/TRAINING PROGRAM
Payer: COMMERCIAL

## 2019-06-02 ENCOUNTER — APPOINTMENT (OUTPATIENT)
Dept: GENERAL RADIOLOGY | Age: 59
End: 2019-06-02
Attending: STUDENT IN AN ORGANIZED HEALTH CARE EDUCATION/TRAINING PROGRAM
Payer: COMMERCIAL

## 2019-06-02 VITALS
HEART RATE: 98 BPM | OXYGEN SATURATION: 94 % | DIASTOLIC BLOOD PRESSURE: 76 MMHG | RESPIRATION RATE: 20 BRPM | SYSTOLIC BLOOD PRESSURE: 115 MMHG | TEMPERATURE: 98.5 F

## 2019-06-02 DIAGNOSIS — K21.9 GASTROESOPHAGEAL REFLUX DISEASE, ESOPHAGITIS PRESENCE NOT SPECIFIED: Primary | ICD-10-CM

## 2019-06-02 LAB
ALBUMIN SERPL-MCNC: 3.5 G/DL (ref 3.5–5)
ALBUMIN/GLOB SERPL: 1.1 {RATIO} (ref 1.1–2.2)
ALP SERPL-CCNC: 115 U/L (ref 45–117)
ALT SERPL-CCNC: 36 U/L (ref 12–78)
ANION GAP SERPL CALC-SCNC: 7 MMOL/L (ref 5–15)
AST SERPL-CCNC: 9 U/L (ref 15–37)
ATRIAL RATE: 87 BPM
BASOPHILS # BLD: 0 K/UL (ref 0–0.1)
BASOPHILS NFR BLD: 0 % (ref 0–1)
BILIRUB SERPL-MCNC: 0.3 MG/DL (ref 0.2–1)
BUN SERPL-MCNC: 9 MG/DL (ref 6–20)
BUN/CREAT SERPL: 10 (ref 12–20)
CALCIUM SERPL-MCNC: 8.9 MG/DL (ref 8.5–10.1)
CALCULATED P AXIS, ECG09: 64 DEGREES
CALCULATED R AXIS, ECG10: 68 DEGREES
CALCULATED T AXIS, ECG11: 65 DEGREES
CHLORIDE SERPL-SCNC: 104 MMOL/L (ref 97–108)
CO2 SERPL-SCNC: 26 MMOL/L (ref 21–32)
COMMENT, HOLDF: NORMAL
CREAT SERPL-MCNC: 0.87 MG/DL (ref 0.55–1.02)
DIAGNOSIS, 93000: NORMAL
DIFFERENTIAL METHOD BLD: ABNORMAL
EOSINOPHIL # BLD: 0.3 K/UL (ref 0–0.4)
EOSINOPHIL NFR BLD: 3 % (ref 0–7)
ERYTHROCYTE [DISTWIDTH] IN BLOOD BY AUTOMATED COUNT: 13.1 % (ref 11.5–14.5)
GLOBULIN SER CALC-MCNC: 3.1 G/DL (ref 2–4)
GLUCOSE SERPL-MCNC: 242 MG/DL (ref 65–100)
HCT VFR BLD AUTO: 43 % (ref 35–47)
HGB BLD-MCNC: 14.6 G/DL (ref 11.5–16)
IMM GRANULOCYTES # BLD AUTO: 0.1 K/UL (ref 0–0.04)
IMM GRANULOCYTES NFR BLD AUTO: 1 % (ref 0–0.5)
LYMPHOCYTES # BLD: 2.5 K/UL (ref 0.8–3.5)
LYMPHOCYTES NFR BLD: 27 % (ref 12–49)
MCH RBC QN AUTO: 30.2 PG (ref 26–34)
MCHC RBC AUTO-ENTMCNC: 34 G/DL (ref 30–36.5)
MCV RBC AUTO: 89 FL (ref 80–99)
MONOCYTES # BLD: 0.6 K/UL (ref 0–1)
MONOCYTES NFR BLD: 7 % (ref 5–13)
NEUTS SEG # BLD: 5.7 K/UL (ref 1.8–8)
NEUTS SEG NFR BLD: 62 % (ref 32–75)
NRBC # BLD: 0 K/UL (ref 0–0.01)
NRBC BLD-RTO: 0 PER 100 WBC
P-R INTERVAL, ECG05: 148 MS
PLATELET # BLD AUTO: 220 K/UL (ref 150–400)
PMV BLD AUTO: 9.8 FL (ref 8.9–12.9)
POTASSIUM SERPL-SCNC: 3.9 MMOL/L (ref 3.5–5.1)
PROT SERPL-MCNC: 6.6 G/DL (ref 6.4–8.2)
Q-T INTERVAL, ECG07: 342 MS
QRS DURATION, ECG06: 80 MS
QTC CALCULATION (BEZET), ECG08: 411 MS
RBC # BLD AUTO: 4.83 M/UL (ref 3.8–5.2)
SAMPLES BEING HELD,HOLD: NORMAL
SODIUM SERPL-SCNC: 137 MMOL/L (ref 136–145)
TROPONIN I SERPL-MCNC: <0.05 NG/ML
VENTRICULAR RATE, ECG03: 87 BPM
WBC # BLD AUTO: 9.2 K/UL (ref 3.6–11)

## 2019-06-02 PROCEDURE — 84484 ASSAY OF TROPONIN QUANT: CPT

## 2019-06-02 PROCEDURE — 74011250637 HC RX REV CODE- 250/637: Performed by: STUDENT IN AN ORGANIZED HEALTH CARE EDUCATION/TRAINING PROGRAM

## 2019-06-02 PROCEDURE — 93005 ELECTROCARDIOGRAM TRACING: CPT

## 2019-06-02 PROCEDURE — 36415 COLL VENOUS BLD VENIPUNCTURE: CPT

## 2019-06-02 PROCEDURE — 85025 COMPLETE CBC W/AUTO DIFF WBC: CPT

## 2019-06-02 PROCEDURE — 71045 X-RAY EXAM CHEST 1 VIEW: CPT

## 2019-06-02 PROCEDURE — 80053 COMPREHEN METABOLIC PANEL: CPT

## 2019-06-02 PROCEDURE — 74011000250 HC RX REV CODE- 250: Performed by: STUDENT IN AN ORGANIZED HEALTH CARE EDUCATION/TRAINING PROGRAM

## 2019-06-02 PROCEDURE — 99285 EMERGENCY DEPT VISIT HI MDM: CPT

## 2019-06-02 RX ADMIN — LIDOCAINE HYDROCHLORIDE 40 ML: 20 SOLUTION ORAL; TOPICAL at 06:28

## 2019-06-02 NOTE — DISCHARGE INSTRUCTIONS

## 2019-06-02 NOTE — ED TRIAGE NOTES
Pt arrives via EMS from home with c/o of \"a severe burning in my throat. I took antacids but it didn't  Help and I couldn't really breathe. +n and cp. Pt reports that her throat felt like it was closing up and had difficulty swallowing pta. Pt able to speak in full sentences, no excess oral secretions noted and does not report difficulty swallowing or cp at this time. OSats 95%. Hx of barretts esophagus.

## 2019-06-02 NOTE — ED PROVIDER NOTES
60 y.o. female with past medical history significant for DM, asthma, Reyes's esophagus, macular degeneration in R eye, and depression who presents from home with chief complaint of throat pain. Pt reports she awakened ~1 hour ago w/ severe burning throat pain. After several minutes, she also began feeling chest pain, lightheadedness, SOB, and nausea. Pt states she felt like her throat was closing, so she called 911. She took 6 antacid tablets PTA w/ no relief. Pt notes she has hx of Reyes's esophagus, but it has never caused sx this severe in the past. Pt notes she takes ASA daily. When asked about cardiac hx, Pt denies personal hx of cardiac disease, stating she had a 3D heart scan ~1 year ago which \"appeared good\". Pt also c/o recent cough, but denies fever. There are no other acute medical concerns at this time. Significant FMHx: Father and Grandfather:  of cardiac arrest in 52's    PCP: Fransico Willoughby MD    Note written by Sil Garcia, as dictated by Arely Escobar MD 6:26 AM           Past Medical History:   Diagnosis Date    Asthma     Reyes's esophagus     Diabetes ([address]     Macular degeneration, right eye     Psychiatric disorder     depression       Past Surgical History:   Procedure Laterality Date    HX CHOLECYSTECTOMY      HX HEENT      tonsils         History reviewed. No pertinent family history.     Social History     Socioeconomic History    Marital status:      Spouse name: Not on file    Number of children: Not on file    Years of education: Not on file    Highest education level: Not on file   Occupational History    Not on file   Social Needs    Financial resource strain: Not on file    Food insecurity:     Worry: Not on file     Inability: Not on file    Transportation needs:     Medical: Not on file     Non-medical: Not on file   Tobacco Use    Smoking status: Former Smoker    Smokeless tobacco: Never Used   Substance and Sexual Activity  Alcohol use: Yes     Comment: occ    Drug use: No    Sexual activity: Not on file   Lifestyle    Physical activity:     Days per week: Not on file     Minutes per session: Not on file    Stress: Not on file   Relationships    Social connections:     Talks on phone: Not on file     Gets together: Not on file     Attends Anabaptist service: Not on file     Active member of club or organization: Not on file     Attends meetings of clubs or organizations: Not on file     Relationship status: Not on file    Intimate partner violence:     Fear of current or ex partner: Not on file     Emotionally abused: Not on file     Physically abused: Not on file     Forced sexual activity: Not on file   Other Topics Concern    Not on file   Social History Narrative    Not on file         ALLERGIES: Vioxx [rofecoxib]    Review of Systems   Constitutional: Negative for chills and fever. HENT: Negative for sore throat. (+) throat pain. Respiratory: Positive for cough and shortness of breath. Cardiovascular: Positive for chest pain. Gastrointestinal: Positive for nausea. Negative for abdominal pain and vomiting. Genitourinary: Negative for dysuria. Musculoskeletal: Negative for back pain. Skin: Negative for rash. Neurological: Positive for light-headedness. Negative for syncope and headaches. Psychiatric/Behavioral: Negative for confusion. All other systems reviewed and are negative. Vitals:    06/02/19 0622   BP: 123/68   Pulse: 94   Resp: 14   Temp: 98.5 °F (36.9 °C)   SpO2: 96%            Physical Exam   Constitutional: She is oriented to person, place, and time. She appears well-developed. No distress. HENT:   Head: Normocephalic and atraumatic. Eyes: Conjunctivae and EOM are normal.   Neck: Normal range of motion. Neck supple. Cardiovascular: Normal rate, regular rhythm and normal heart sounds. Pulmonary/Chest: Effort normal and breath sounds normal. No respiratory distress. Abdominal: Soft. There is no tenderness. There is no guarding. Musculoskeletal: Normal range of motion. She exhibits no edema. Neurological: She is alert and oriented to person, place, and time. She exhibits normal muscle tone. Skin: Skin is warm and dry. Note written by Sil Baires, as dictated by Cody Tijerina MD 6:26 AM    MDM       Procedures      ED EKG interpretation:  Rhythm: normal sinus rhythm; and regular . Rate (approx.): 87; Axis: normal; ST/T wave: no STEMI; normal intervals  Note written by Sil Baires, as dictated by Cody Tijerina MD 6:25 AM    The patient is resting comfortably and feels better, is alert and in no distress. The repeat examination is unremarkable and benign. The electrocardiogram shows no signs of acute ischemia and the history, exam, diagnostic testing and current condition do not suggest that this patient is having an acute myocardial infarction, significant arrhythmia, unstable angina (low HEART score), esophageal perforation, pulmonary embolism, aortic dissection, pneumothorax, severe pneumonia, sepsis or other significant pathology that would warrant further testing, continued ED treatment, admission, or cardiology or other specialist consultation at this point. The vital signs have been stable. The patient's condition is stable and appropriate for discharge. The patient will pursue further outpatient evaluation with the primary care physician, other designated physician or cardiologist. The patient and/or caregivers have expressed a clear and thorough understanding and agree to follow up as instructed.

## 2019-06-02 NOTE — ED NOTES
I have reviewed discharge instructions with the patient. The patient verbalized understanding. VSS, respirations even and unlabored and in no acute distress. Ambulated out of the department with a steady gait. Pt using uber/cab ride home. Pt is self pay.

## 2019-06-04 ENCOUNTER — TELEPHONE (OUTPATIENT)
Dept: PRIMARY CARE CLINIC | Age: 59
End: 2019-06-04

## 2019-06-04 NOTE — TELEPHONE ENCOUNTER
Just received fax for prior authorization for trulicity. PA submitted. Waiting for insurance decision.

## 2019-06-04 NOTE — TELEPHONE ENCOUNTER
Spoke with [facility] from [facility] and informed that after completion of PA, insurance stated that PA not needed. Per pharmacist, PA is not needed and they are not sure why it said that initially. Prescription ready , pt notified and verbalized her understanding.

## 2019-06-12 ENCOUNTER — OFFICE VISIT (OUTPATIENT)
Dept: PRIMARY CARE CLINIC | Age: 59
End: 2019-06-12

## 2019-06-12 VITALS
OXYGEN SATURATION: 98 % | SYSTOLIC BLOOD PRESSURE: 128 MMHG | HEIGHT: 61 IN | RESPIRATION RATE: 17 BRPM | WEIGHT: 177.8 LBS | HEART RATE: 86 BPM | BODY MASS INDEX: 33.57 KG/M2 | DIASTOLIC BLOOD PRESSURE: 83 MMHG | TEMPERATURE: 98.4 F

## 2019-06-12 DIAGNOSIS — E11.9 TYPE 2 DIABETES MELLITUS WITHOUT COMPLICATION, WITHOUT LONG-TERM CURRENT USE OF INSULIN (HCC): ICD-10-CM

## 2019-06-12 DIAGNOSIS — M25.551 PAIN OF RIGHT HIP JOINT: ICD-10-CM

## 2019-06-12 DIAGNOSIS — F90.9 ATTENTION DEFICIT HYPERACTIVITY DISORDER (ADHD), UNSPECIFIED ADHD TYPE: ICD-10-CM

## 2019-06-12 DIAGNOSIS — E53.8 VITAMIN B12 DEFICIENCY: Primary | ICD-10-CM

## 2019-06-12 NOTE — PROGRESS NOTES
Snady Muas is a 60 y.o.  female and presents with     Chief Complaint   Patient presents with   [facility] Follow Up     went to [facility] 6/2/19 with SOB, nausea, and abdominal pains    Medication Refill     ritalin and b12    Diabetes    Hip Pain    Behavioral Problem     Patient went to ER on June 2 with symptoms of throat pain, chest pain, shortness of breath, lightheadedness and nausea. Work-up for acute MI was negative. It was felt that her symptoms were related to severe acid reflux. Patient is also here for follow-up on her hip pain and x-ray results. She does want to see an orthopedic doctor for her chronic hip pain on the right side. She says that she has ADD and used to take Ritalin for the same. Patient says that she has a history of vitamin B12 deficiency related to the metformin she takes and apparently she has been taking injections monthly for a long time. She denies history of pernicious anemia. Her current B12 levels are normal.  Patient says she saw another provider and got 3 months worth of injections for B12 and also a refill on her Ritalin from that provider. Past Medical History:   Diagnosis Date    Asthma     Reyes's esophagus     Diabetes ([facility] [address]     Macular degeneration, right eye     Psychiatric disorder     depression     Past Surgical History:   Procedure Laterality Date    HX CHOLECYSTECTOMY      HX HEENT      tonsils     Current Outpatient Medications   Medication Sig    insulin glargine (LANTUS,BASAGLAR) 100 unit/mL (3 mL) inpn Administer 15 units subcut once daily (Patient taking differently: 14 Units daily. Administer 15 units subcut once daily)    dulaglutide (TRULICITY) 1.5 DL/7.4 mL sub-q pen 0.5 mL by SubCUTAneous route every seven (7) days.     traZODone (DESYREL) 50 mg tablet TAKE 1/2 TABLET BY MOUTH EVERY DAY AT BEDTIME    NEXIUM 40 mg capsule TAKE 1 CAPSULE BY MOUTH TWICE A DAY    cyanocobalamin (VITAMIN B12) 1,000 mcg/mL injection INJECT 1 ML INTRAMUSCULARRLY EVERY WEEK    methylphenidate HCl (RITALIN) 20 mg tablet TAKE 1 TABLET BY MOUTH THREE TIMES A DAY    TESTOSTERONE, BULK, testosterone    VENTOLIN HFA 90 mcg/actuation inhaler INHALE 2 PUFFS EVERY 4 HOURS AS NEEDED FOR WHEEZING    progesterone (PROMETRIUM) 200 mg capsule TAKE 1 CAPSULE (200 MG) BY ORAL ROUTE ONCE DAILY IN THE EVENING    DULoxetine (CYMBALTA) 60 mg capsule Take 1 Cap by mouth daily.  nicotine (NICODERM CQ) 21 mg/24 hr 1 Patch by TransDERmal route every twenty-four (24) hours.  simvastatin (ZOCOR) 20 mg tablet Take 20 mg by mouth nightly.  metFORMIN ER (GLUCOPHAGE XR) 750 mg tablet Take 750 mg by mouth daily. 3 tabs once a day    Insulin Needles, Disposable, 30 gauge x 1/3\" DM    traMADol (ULTRAM) 50 mg tablet Take 50 mg by mouth every six (6) hours as needed for Pain.  ibuprofen (MOTRIN) 800 mg tablet Take 1 Tab by mouth every eight (8) hours as needed for Pain.  cyclobenzaprine (FLEXERIL) 10 mg tablet Take 1 Tab by mouth three (3) times daily as needed for Muscle Spasm(s).  linagliptin (TRADJENTA) 5 mg tablet Take 1 Tab by mouth daily.  BD INTEGRA SYRINGE 3 mL 25 gauge x 1\" syrg USE WITH VITAMIN B12 INJECTION WEEKLY    valACYclovir (VALTREX) 500 mg tablet TAKE 1 TABLET BY MOUTH THREE TIMES A DAY     No current facility-administered medications for this visit.       Health Maintenance   Topic Date Due    Pneumococcal 0-64 years (1 of 1 - PPSV23) 09/01/1965    FOOT EXAM Q1  09/01/1969    EYE EXAM RETINAL OR DILATED  09/01/1969    DTaP/Tdap/Td series (1 - Tdap) 09/01/1980    Shingrix Vaccine Age 50> (1 of 2) 09/01/2009    FOBT Q 1 YEAR AGE 50-75  09/01/2009    Influenza Age 9 to Adult  08/01/2019    HEMOGLOBIN A1C Q6M  10/17/2019    MICROALBUMIN Q1  04/17/2020    LIPID PANEL Q1  04/17/2020    BREAST CANCER SCRN MAMMOGRAM  03/05/2021    PAP AKA CERVICAL CYTOLOGY  01/23/2022    Hepatitis C Screening  Completed       There is no immunization history on file for this patient. No LMP recorded. Patient is postmenopausal.        Allergies and Intolerances: Allergies   Allergen Reactions    Vioxx [Rofecoxib] Other (comments)       Family History:   No family history on file. Social History:   She  reports that she has quit smoking. She has never used smokeless tobacco.  She  reports that she drinks alcohol. Review of Systems:   General: negative for - chills, fatigue, fever, weight change  Psych: negative for - anxiety, depression, irritability or mood swings  ENT: negative for - headaches, hearing change, nasal congestion, oral lesions, sneezing or sore throat  Heme/ Lymph: negative for - bleeding problems, bruising, pallor or swollen lymph nodes  Endo: negative for - hot flashes, polydipsia/polyuria or temperature intolerance  Resp: negative for - cough, shortness of breath or wheezing  CV: negative for - chest pain, edema or palpitations  GI: negative for - abdominal pain, change in bowel habits, constipation, diarrhea or nausea/vomiting  : negative for - dysuria, hematuria, incontinence, pelvic pain or vulvar/vaginal symptoms  MSK: negative for - joint pain, joint swelling or muscle pain  Neuro: negative for - confusion, headaches, seizures or weakness  Derm: negative for - dry skin, hair changes, rash or skin lesion changes          Physical:   Vitals:   Vitals:    06/12/19 0904   BP: 128/83   Pulse: 86   Resp: 17   Temp: 98.4 °F (36.9 °C)   TempSrc: Oral   SpO2: 98%   Weight: 177 lb 12.8 oz (80.6 kg)   Height: 5' 1\" (1.549 m)           Exam:   HEENT- atraumatic,normocephalic, awake, oriented, well nourished  Neck - supple,no enlarged lymph nodes, no JVD, no thyromegaly  Chest- CTA, no rhonchi, no crackles  Heart- rrr, no murmurs / gallop/rub  Abdomen- soft,BS+,NT, no hepatosplenomegaly  Ext - no c/c/edema   Neuro- no focal deficits. Power 5/5 all extremities  Skin - warm,dry, no obvious rashes.           Review of Data:   LABS:   Lab Results Component Value Date/Time    WBC 9.2 06/02/2019 06:31 AM    HGB 14.6 06/02/2019 06:31 AM    HCT 43.0 06/02/2019 06:31 AM    PLATELET 259 62/52/7548 06:31 AM     Lab Results   Component Value Date/Time    Sodium 137 06/02/2019 06:31 AM    Potassium 3.9 06/02/2019 06:31 AM    Chloride 104 06/02/2019 06:31 AM    CO2 26 06/02/2019 06:31 AM    Glucose 242 (H) 06/02/2019 06:31 AM    BUN 9 06/02/2019 06:31 AM    Creatinine 0.87 06/02/2019 06:31 AM     Lab Results   Component Value Date/Time    Cholesterol, total 146 04/17/2019 02:13 PM    HDL Cholesterol 34 (L) 04/17/2019 02:13 PM    LDL, calculated 86 04/17/2019 02:13 PM    Triglyceride 128 04/17/2019 02:13 PM     No results found for: GPT        Impression / Plan:        ICD-10-CM ICD-9-CM    1. Vitamin B12 deficiency E53.8 266.2    2. Type 2 diabetes mellitus without complication, without long-term current use of insulin (HCC) E11.9 250.00    3. Attention deficit hyperactivity disorder (ADHD), unspecified ADHD type F90.9 314.01    4. Pain of right hip joint M25.551 719.45 REFERRAL TO ORTHOPEDICS            Pt reports B12 def  And has been getting B12 inj    ADHD   -on    Ritali 10mg tid, will need compliance drug testing. Patient says that she cannot give a urine sample today as she just went to the restroom and now she is in a rush to go somewhere and will be back next week. Explained to patient risk benefits of the medications. Advised patient to stop meds if having any side effects. Pt verbalized understanding of the instructions. I have discussed the diagnosis with the patient and the intended plan as seen in the above orders. The patient has received an after-visit summary and questions were answered concerning future plans. I have discussed medication side effects and warnings with the patient as well. I have reviewed the plan of care with the patient, accepted their input and they are in agreement with the treatment goals.      Reviewed plan of care. Patient has provided input and agrees with goals.         Ray Anderson MD

## 2019-06-24 ENCOUNTER — OFFICE VISIT (OUTPATIENT)
Dept: PRIMARY CARE CLINIC | Age: 59
End: 2019-06-24

## 2019-06-24 VITALS
HEART RATE: 97 BPM | OXYGEN SATURATION: 99 % | DIASTOLIC BLOOD PRESSURE: 88 MMHG | BODY MASS INDEX: 33.64 KG/M2 | RESPIRATION RATE: 17 BRPM | SYSTOLIC BLOOD PRESSURE: 151 MMHG | HEIGHT: 61 IN | TEMPERATURE: 98.6 F | WEIGHT: 178.2 LBS

## 2019-06-24 DIAGNOSIS — F90.9 ATTENTION DEFICIT HYPERACTIVITY DISORDER (ADHD), UNSPECIFIED ADHD TYPE: ICD-10-CM

## 2019-06-24 DIAGNOSIS — E53.8 VITAMIN B12 DEFICIENCY: Primary | ICD-10-CM

## 2019-06-24 RX ORDER — METHYLPHENIDATE HYDROCHLORIDE 10 MG/1
10 TABLET ORAL 3 TIMES DAILY
Qty: 90 TAB | Refills: 0 | Status: SHIPPED | OUTPATIENT
Start: 2019-06-24 | End: 2019-08-19 | Stop reason: SDUPTHER

## 2019-06-24 RX ORDER — ESCITALOPRAM OXALATE 20 MG/1
20 TABLET ORAL DAILY
COMMUNITY
End: 2019-08-19

## 2019-06-24 RX ORDER — METHYLPHENIDATE HYDROCHLORIDE 10 MG/1
10 TABLET ORAL 3 TIMES DAILY
Qty: 90 TAB | Refills: 0 | Status: SHIPPED | OUTPATIENT
Start: 2019-07-24 | End: 2019-08-19 | Stop reason: SDUPTHER

## 2019-06-28 LAB — DRUGS UR: NORMAL

## 2019-07-09 RX ORDER — CYANOCOBALAMIN 1000 UG/ML
1000 INJECTION, SOLUTION INTRAMUSCULAR; SUBCUTANEOUS
Qty: 1 VIAL | Refills: 3
Start: 2019-07-09 | End: 2019-07-25 | Stop reason: SDUPTHER

## 2019-07-09 NOTE — TELEPHONE ENCOUNTER
Pt called and stated she would like to know what is taking so long for her vit b12 to be sent to the pharmacy, was told it would be sent and [facility] has not received it yet.

## 2019-07-10 ENCOUNTER — TELEPHONE (OUTPATIENT)
Dept: PRIMARY CARE CLINIC | Age: 59
End: 2019-07-10

## 2019-07-10 NOTE — TELEPHONE ENCOUNTER
Pt called regarding B12 injection order says discontinued in our system but PT says it was sent to pharmacy please call PT to advise on status of refill [phone]

## 2019-07-25 RX ORDER — CYANOCOBALAMIN 1000 UG/ML
1000 INJECTION, SOLUTION INTRAMUSCULAR; SUBCUTANEOUS
Qty: 1 VIAL | Refills: 3
Start: 2019-07-25 | End: 2019-08-19 | Stop reason: SDUPTHER

## 2019-07-25 RX ORDER — CYANOCOBALAMIN 1000 UG/ML
1000 INJECTION, SOLUTION INTRAMUSCULAR; SUBCUTANEOUS
Qty: 1 VIAL | Refills: 3 | Status: SHIPPED | OUTPATIENT
Start: 2019-07-25 | End: 2019-07-25 | Stop reason: SDUPTHER

## 2019-07-25 NOTE — TELEPHONE ENCOUNTER
Patient stated [facility] does not have her pharmacy    Please contact the pharmacy to get the b12 refilled

## 2019-08-19 ENCOUNTER — OFFICE VISIT (OUTPATIENT)
Dept: PRIMARY CARE CLINIC | Age: 59
End: 2019-08-19

## 2019-08-19 VITALS
TEMPERATURE: 98.4 F | BODY MASS INDEX: 33.12 KG/M2 | HEART RATE: 90 BPM | SYSTOLIC BLOOD PRESSURE: 135 MMHG | DIASTOLIC BLOOD PRESSURE: 82 MMHG | WEIGHT: 175.4 LBS | OXYGEN SATURATION: 96 % | RESPIRATION RATE: 16 BRPM | HEIGHT: 61 IN

## 2019-08-19 DIAGNOSIS — E11.9 TYPE 2 DIABETES MELLITUS WITHOUT COMPLICATION, WITHOUT LONG-TERM CURRENT USE OF INSULIN (HCC): ICD-10-CM

## 2019-08-19 DIAGNOSIS — R20.0 RIGHT LEG NUMBNESS: ICD-10-CM

## 2019-08-19 DIAGNOSIS — E78.00 HYPERCHOLESTEREMIA: ICD-10-CM

## 2019-08-19 DIAGNOSIS — F51.01 PRIMARY INSOMNIA: ICD-10-CM

## 2019-08-19 DIAGNOSIS — E53.8 VITAMIN B12 DEFICIENCY: Primary | ICD-10-CM

## 2019-08-19 DIAGNOSIS — F90.9 ATTENTION DEFICIT HYPERACTIVITY DISORDER (ADHD), UNSPECIFIED ADHD TYPE: ICD-10-CM

## 2019-08-19 LAB — HBA1C MFR BLD HPLC: 8.7 %

## 2019-08-19 RX ORDER — CYANOCOBALAMIN 1000 UG/ML
1000 INJECTION, SOLUTION INTRAMUSCULAR; SUBCUTANEOUS
Qty: 1 VIAL | Refills: 12
Start: 2019-08-19 | End: 2019-08-26 | Stop reason: SDUPTHER

## 2019-08-19 RX ORDER — SIMVASTATIN 20 MG/1
20 TABLET, FILM COATED ORAL
Qty: 30 TAB | Refills: 3 | Status: SHIPPED | OUTPATIENT
Start: 2019-08-19 | End: 2019-12-26

## 2019-08-19 RX ORDER — METHYLPHENIDATE HYDROCHLORIDE 10 MG/1
10 TABLET ORAL 3 TIMES DAILY
Qty: 90 TAB | Refills: 0 | Status: SHIPPED | OUTPATIENT
Start: 2019-09-19 | End: 2019-08-26 | Stop reason: SDUPTHER

## 2019-08-19 RX ORDER — TRAZODONE HYDROCHLORIDE 50 MG/1
50 TABLET ORAL
Qty: 30 TAB | Refills: 3 | Status: SHIPPED | OUTPATIENT
Start: 2019-08-19

## 2019-08-19 RX ORDER — INSULIN GLARGINE 100 [IU]/ML
INJECTION, SOLUTION SUBCUTANEOUS
Qty: 5 PEN | Refills: 3 | Status: SHIPPED | OUTPATIENT
Start: 2019-08-19 | End: 2019-12-30 | Stop reason: SDUPTHER

## 2019-08-19 RX ORDER — METHYLPHENIDATE HYDROCHLORIDE 10 MG/1
10 TABLET ORAL 3 TIMES DAILY
Qty: 90 TAB | Refills: 0 | Status: SHIPPED | OUTPATIENT
Start: 2019-08-19 | End: 2019-10-31 | Stop reason: SDUPTHER

## 2019-08-19 NOTE — PROGRESS NOTES
Terry Negron is a 60 y.o.  female and presents with     Chief Complaint   Patient presents with    Medication Refill     ritalin, simvastatin, trazodone    Medication Evaluation     would like B12 to be weekly, not monthly    Diabetes    Cholesterol Problem    Leg Pain    Behavioral Problem     Pt has pain and numbnes in rt leg. Pt saw ortho but says it was not helpful. Pt wants to take vitamin B12 inj once a week. Pt says she pays for it. Pt wants to live vivacious and gets testosterone pellets underneath her skin inserted underneath the skin every three months in [place]. Pt has ADHD and needs refills on meds. Pt says DM has improved. Pt takes chol meds. Past Medical History:   Diagnosis Date    Asthma     Reyes's esophagus     Diabetes ([address]     Macular degeneration, right eye     Psychiatric disorder     depression     Past Surgical History:   Procedure Laterality Date    HX CHOLECYSTECTOMY      HX HEENT      tonsils     Current Outpatient Medications   Medication Sig    cyanocobalamin (VITAMIN B12) 1,000 mcg/mL injection 1 mL by IntraMUSCular route every seven (7) days.  methylphenidate HCl (RITALIN) 10 mg tablet Take 1 Tab by mouth three (3) times daily. Max Daily Amount: 30 mg.    traZODone (DESYREL) 50 mg tablet Take 1 Tab by mouth nightly.  simvastatin (ZOCOR) 20 mg tablet Take 1 Tab by mouth nightly.  [START ON 9/19/2019] methylphenidate HCl (RITALIN) 10 mg tablet Take 1 Tab by mouth three (3) times daily. Max Daily Amount: 30 mg.    insulin glargine (LANTUS,BASAGLAR) 100 unit/mL (3 mL) inpn Administer 15 units subcut once daily (Patient taking differently: 15 Units daily. Administer 15 units subcut once daily)    dulaglutide (TRULICITY) 1.5 VJ/3.6 mL sub-q pen 0.5 mL by SubCUTAneous route every seven (7) days.  traMADol (ULTRAM) 50 mg tablet Take 25 mg by mouth every six (6) hours as needed for Pain.     ibuprofen (MOTRIN) 800 mg tablet Take 1 Tab by mouth every eight (8) hours as needed for Pain.  cyclobenzaprine (FLEXERIL) 10 mg tablet Take 1 Tab by mouth three (3) times daily as needed for Muscle Spasm(s).  NEXIUM 40 mg capsule TAKE 1 CAPSULE BY MOUTH TWICE A DAY    TESTOSTERONE, BULK, testosterone    VENTOLIN HFA 90 mcg/actuation inhaler INHALE 2 PUFFS EVERY 4 HOURS AS NEEDED FOR WHEEZING    progesterone (PROMETRIUM) 200 mg capsule TAKE 1 CAPSULE (200 MG) BY ORAL ROUTE ONCE DAILY IN THE EVENING    valACYclovir (VALTREX) 500 mg tablet as needed.  nicotine (NICODERM CQ) 21 mg/24 hr 1 Patch by TransDERmal route every twenty-four (24) hours.  metFORMIN ER (GLUCOPHAGE XR) 750 mg tablet Take 1,500 mg by mouth daily.  Insulin Needles, Disposable, 30 gauge x 1/3\" DM    BD INTEGRA SYRINGE 3 mL 25 gauge x 1\" syrg USE WITH VITAMIN B12 INJECTION WEEKLY     No current facility-administered medications for this visit. Health Maintenance   Topic Date Due    Pneumococcal 0-64 years (1 of 1 - PPSV23) 09/01/1965    FOOT EXAM Q1  09/01/1969    EYE EXAM RETINAL OR DILATED  09/01/1969    DTaP/Tdap/Td series (1 - Tdap) 09/01/1980    Shingrix Vaccine Age 50> (1 of 2) 09/01/2009    FOBT Q 1 YEAR AGE 50-75  09/01/2009    Influenza Age 9 to Adult  08/01/2019    HEMOGLOBIN A1C Q6M  10/17/2019    MICROALBUMIN Q1  04/17/2020    LIPID PANEL Q1  04/17/2020    BREAST CANCER SCRN MAMMOGRAM  03/05/2021    PAP AKA CERVICAL CYTOLOGY  01/23/2022    Hepatitis C Screening  Completed       There is no immunization history on file for this patient. No LMP recorded. Patient is postmenopausal.        Allergies and Intolerances: Allergies   Allergen Reactions    Vioxx [Rofecoxib] Other (comments)       Family History:   No family history on file. Social History:   She  reports that she has quit smoking. She has never used smokeless tobacco.  She  reports that she drinks alcohol.             Review of Systems:   General: negative for - chills, fatigue, fever, weight change  Psych: negative for - anxiety, depression, irritability or mood swings  ENT: negative for - headaches, hearing change, nasal congestion, oral lesions, sneezing or sore throat  Heme/ Lymph: negative for - bleeding problems, bruising, pallor or swollen lymph nodes  Endo: negative for - hot flashes, polydipsia/polyuria or temperature intolerance  Resp: negative for - cough, shortness of breath or wheezing  CV: negative for - chest pain, edema or palpitations  GI: negative for - abdominal pain, change in bowel habits, constipation, diarrhea or nausea/vomiting  : negative for - dysuria, hematuria, incontinence, pelvic pain or vulvar/vaginal symptoms  MSK: negative for - joint pain, joint swelling or muscle pain  Neuro: negative for - confusion, headaches, seizures or weakness  Derm: negative for - dry skin, hair changes, rash or skin lesion changes          Physical:   Vitals:   Vitals:    08/19/19 1127   BP: 135/82   Pulse: 90   Resp: 16   Temp: 98.4 °F (36.9 °C)   TempSrc: Oral   SpO2: 96%   Weight: 175 lb 6.4 oz (79.6 kg)   Height: 5' 1\" (1.549 m)           Exam:   HEENT- atraumatic,normocephalic, awake, oriented, well nourished  Neck - supple,no enlarged lymph nodes, no JVD, no thyromegaly  Chest- CTA, no rhonchi, no crackles  Heart- rrr, no murmurs / gallop/rub  Abdomen- soft,BS+,NT, no hepatosplenomegaly  Ext - no c/c/edema   Neuro- no focal deficits. Power 5/5 all extremities  Skin - warm,dry, no obvious rashes.           Review of Data:   LABS:   Lab Results   Component Value Date/Time    WBC 9.2 06/02/2019 06:31 AM    HGB 14.6 06/02/2019 06:31 AM    HCT 43.0 06/02/2019 06:31 AM    PLATELET 916 69/87/7220 06:31 AM     Lab Results   Component Value Date/Time    Sodium 137 06/02/2019 06:31 AM    Potassium 3.9 06/02/2019 06:31 AM    Chloride 104 06/02/2019 06:31 AM    CO2 26 06/02/2019 06:31 AM    Glucose 242 (H) 06/02/2019 06:31 AM    BUN 9 06/02/2019 06:31 AM    Creatinine 0.87 06/02/2019 06:31 AM     Lab Results   Component Value Date/Time    Cholesterol, total 146 04/17/2019 02:13 PM    HDL Cholesterol 34 (L) 04/17/2019 02:13 PM    LDL, calculated 86 04/17/2019 02:13 PM    Triglyceride 128 04/17/2019 02:13 PM     No results found for: GPT        Impression / Plan:        ICD-10-CM ICD-9-CM    1. Vitamin B12 deficiency E53.8 266.2 cyanocobalamin (VITAMIN B12) 1,000 mcg/mL injection   2. Right leg numbness R20.0 782.0 REFERRAL TO NEUROLOGY   3. Attention deficit hyperactivity disorder (ADHD), unspecified ADHD type F90.9 314.01 methylphenidate HCl (RITALIN) 10 mg tablet      methylphenidate HCl (RITALIN) 10 mg tablet   4. Primary insomnia F51.01 307.42 traZODone (DESYREL) 50 mg tablet   5. Hypercholesteremia E78.00 272.0 simvastatin (ZOCOR) 20 mg tablet   6. Type 2 diabetes mellitus without complication, without long-term current use of insulin (HCC) E11.9 250.00 AMB POC HEMOGLOBIN A1C     Informed patient  That she needs vitamin B12 once a month and not weekly. However patient states that she wants to do weekly injections and she is the one who was paying for it and not her insurance company. She says that she had needed transfusions in the past and she does not want to be in that situation in the future. She also has testosterone pellets inserted underneath the skin every 3 months in [place]. Asked the patient if that is done by a physician and whether it is legal.  She informs that it is done by a physician and she takes full responsibility. She says they have been doing it in [facility] for many many years. She feels like when she gets her testosterone pellets she feels more energetic and she is willing to take any risk associated with it. Pt says she will be due for MMG n October    Pt says she is not due for colonscopy for next 5 years. Explained to patient risk benefits of the medications. Advised patient to stop meds if having any side effects. Pt verbalized understanding of the instructions. I have discussed the diagnosis with the patient and the intended plan as seen in the above orders. The patient has received an after-visit summary and questions were answered concerning future plans. I have discussed medication side effects and warnings with the patient as well. I have reviewed the plan of care with the patient, accepted their input and they are in agreement with the treatment goals. Reviewed plan of care. Patient has provided input and agrees with goals.         Rosita Portillo MD

## 2019-08-19 NOTE — PROGRESS NOTES
Km Beckford is a 60 y.o. female    Chief Complaint   Patient presents with    Medication Refill     ritalin, simvastatin, trazodone    Medication Evaluation     would like B12 to be weekly, not monthly       1. Have you been to the ER, urgent care clinic since your last visit? Hospitalized since your last visit? No    2. Have you seen or consulted any other health care providers outside of the [address] [place] [address] since your last visit? Include any pap smears or colon screening. No    No flowsheet data found.      Health Maintenance Due   Topic Date Due    Pneumococcal 0-64 years (1 of 1 - PPSV23) 09/01/1965    FOOT EXAM Q1  09/01/1969    EYE EXAM RETINAL OR DILATED  09/01/1969    DTaP/Tdap/Td series (1 - Tdap) 09/01/1980    Shingrix Vaccine Age 50> (1 of 2) 09/01/2009    FOBT Q 1 YEAR AGE 50-75  09/01/2009    Influenza Age 9 to Adult  08/01/2019

## 2019-08-21 ENCOUNTER — TELEPHONE (OUTPATIENT)
Dept: PRIMARY CARE CLINIC | Age: 59
End: 2019-08-21

## 2019-08-21 NOTE — TELEPHONE ENCOUNTER
I have spoken to Raquel Dhillon the pharmacist over at [facility] who stated insurance is not covering the type of medication requested (meaning the injectable) He suggested patient take OTC 5,000 mcg Sublingual tablets. Patient has also been picking up the injection for $16.73, which she can still do if she would like too. I will notify patient and let her know.

## 2019-08-21 NOTE — TELEPHONE ENCOUNTER
Spoke to patient who called her insurance company. They told her it would be covered if the physician writes a pre-determination stating why she gets if weekly vs monthly. They also told her [facility] never put the PA through.

## 2019-08-21 NOTE — TELEPHONE ENCOUNTER
----- Message from Logan Fermin sent at 8/19/2019  2:37 PM EDT -----  Regarding: Nazario/hetal  Pt is requesting a prior authorization for Vitamin B12. She stated her insurance will cover it with a Rx. Pts number is [phone].

## 2019-08-21 NOTE — TELEPHONE ENCOUNTER
I have spoken with patient and let her know what the pharmacist said. She stated she really wants to try and stay on the injectable since that is what she is use too and it absorbs a lot better than the tablets due to her diabetes. I let patient know I would try PA and we will go from there. She verbalized her understanding and thanked me.

## 2019-08-22 NOTE — TELEPHONE ENCOUNTER
PA denied by insurance. Spoke with patient and let her know, she states she is going to call insurance and see what's going on but if they still deny it then she will just pay for it out of pocket.

## 2019-08-26 ENCOUNTER — TELEPHONE (OUTPATIENT)
Dept: PRIMARY CARE CLINIC | Age: 59
End: 2019-08-26

## 2019-08-26 DIAGNOSIS — E53.8 VITAMIN B12 DEFICIENCY: ICD-10-CM

## 2019-08-26 RX ORDER — CYANOCOBALAMIN 1000 UG/ML
1000 INJECTION, SOLUTION INTRAMUSCULAR; SUBCUTANEOUS
Qty: 1 VIAL | Refills: 12 | Status: SHIPPED | OUTPATIENT
Start: 2019-08-26 | End: 2019-10-31 | Stop reason: ALTCHOICE

## 2019-09-20 ENCOUNTER — TELEPHONE (OUTPATIENT)
Dept: PRIMARY CARE CLINIC | Age: 59
End: 2019-09-20

## 2019-09-20 NOTE — TELEPHONE ENCOUNTER
----- Message from Arjun Haynes sent at 9/19/2019 12:16 PM EDT -----  Regarding: Dr. Kiley Redd would like a call back regarding labs. Pt's best contact [phone].

## 2019-09-24 ENCOUNTER — TELEPHONE (OUTPATIENT)
Dept: PRIMARY CARE CLINIC | Age: 59
End: 2019-09-24

## 2019-09-24 NOTE — TELEPHONE ENCOUNTER
----- Message from Rachael Martinez sent at 9/23/2019 11:48 AM EDT -----  Regarding: Dr. Lange/Telephone  Patient return call    Caller's first and last name and relationship (if not the patient):      Best contact number(s):[phone]      Whose call is being returned:      Details to clarify the request: Patient wants to know if her kidney and liver function have been checked      Rachael Martinez

## 2019-10-22 ENCOUNTER — OFFICE VISIT (OUTPATIENT)
Dept: NEUROLOGY | Age: 59
End: 2019-10-22

## 2019-10-22 VITALS
BODY MASS INDEX: 33.04 KG/M2 | OXYGEN SATURATION: 98 % | RESPIRATION RATE: 16 BRPM | HEART RATE: 91 BPM | SYSTOLIC BLOOD PRESSURE: 136 MMHG | HEIGHT: 61 IN | DIASTOLIC BLOOD PRESSURE: 84 MMHG | WEIGHT: 175 LBS

## 2019-10-22 DIAGNOSIS — M54.41 CHRONIC BILATERAL LOW BACK PAIN WITH RIGHT-SIDED SCIATICA: Primary | ICD-10-CM

## 2019-10-22 DIAGNOSIS — G89.29 CHRONIC BILATERAL LOW BACK PAIN WITH RIGHT-SIDED SCIATICA: Primary | ICD-10-CM

## 2019-10-22 NOTE — PROGRESS NOTES
Chief Complaint   Patient presents with    New Patient         HISTORY OF PRESENT ILLNESS  Candis Waterman is a 60 y.o. female who came in for neurological consultation requested by Dr. Chelita Nassar. She states that she has had chronic back pain that started around 29 and it has continued to bother her off and on. Lately she has been using difficulties doing her daily activities at times the pain will be so severe that she cannot walk. The right leg will tend to give out. The pain is located mainly in the right lower back and radiates down into the right buttock and posterior thigh. No sensory deficits. She has been on multiple analgesics, muscle relaxers, hydrocodone etc. but they have not helped her much and currently she is not taking anything. She does exercises to strengthen her lower back routinely at home. She was referred to see orthopedic specialist who did x-rays and they were unremarkable. Overall, she was not very pleased with the recommendations there and came for another opinion. Denies any difficulties with bladder or bowel control. Past Medical History:   Diagnosis Date    Asthma     Reyes's esophagus     Diabetes ([facility] [address]     Macular degeneration, right eye     Psychiatric disorder     depression     Current Outpatient Medications   Medication Sig    methylphenidate HCl (RITALIN) 10 mg tablet Take 1 Tab by mouth three (3) times daily. Max Daily Amount: 30 mg.    traZODone (DESYREL) 50 mg tablet Take 1 Tab by mouth nightly.  simvastatin (ZOCOR) 20 mg tablet Take 1 Tab by mouth nightly.  insulin glargine (LANTUS,BASAGLAR) 100 unit/mL (3 mL) inpn Administer 20 units subcut once daily    Insulin Needles, Disposable, 30 gauge x 1/3\" DM    dulaglutide (TRULICITY) 1.5 PT/8.4 mL sub-q pen 0.5 mL by SubCUTAneous route every seven (7) days.     TESTOSTERONE, BULK, testosterone    VENTOLIN HFA 90 mcg/actuation inhaler INHALE 2 PUFFS EVERY 4 HOURS AS NEEDED FOR WHEEZING    progesterone (PROMETRIUM) 200 mg capsule TAKE 1 CAPSULE (200 MG) BY ORAL ROUTE ONCE DAILY IN THE EVENING    BD INTEGRA SYRINGE 3 mL 25 gauge x 1\" syrg USE WITH VITAMIN B12 INJECTION WEEKLY    valACYclovir (VALTREX) 500 mg tablet as needed.  nicotine (NICODERM CQ) 21 mg/24 hr 1 Patch by TransDERmal route every twenty-four (24) hours.  metFORMIN ER (GLUCOPHAGE XR) 750 mg tablet Take 1,500 mg by mouth daily.  cyanocobalamin (VITAMIN B12) 1,000 mcg/mL injection 1 mL by IntraMUSCular route every seven (7) days.  traMADol (ULTRAM) 50 mg tablet Take 25 mg by mouth every six (6) hours as needed for Pain.  ibuprofen (MOTRIN) 800 mg tablet Take 1 Tab by mouth every eight (8) hours as needed for Pain.  cyclobenzaprine (FLEXERIL) 10 mg tablet Take 1 Tab by mouth three (3) times daily as needed for Muscle Spasm(s).  NEXIUM 40 mg capsule TAKE 1 CAPSULE BY MOUTH TWICE A DAY     No current facility-administered medications for this visit. Allergies   Allergen Reactions    Vioxx [Rofecoxib] Other (comments)     History reviewed. No pertinent family history.   Social History     Tobacco Use    Smoking status: Former Smoker    Smokeless tobacco: Never Used   Substance Use Topics    Alcohol use: Yes     Comment: occ    Drug use: No     Past Surgical History:   Procedure Laterality Date    HX CHOLECYSTECTOMY      HX HEENT      tonsils         REVIEW OF SYSTEMS  Review of Systems - History obtained from the patient  Psychological ROS: negative  ENT ROS: negative  Hematological and Lymphatic ROS: negative  Endocrine ROS: negative  Respiratory ROS: no cough, shortness of breath, or wheezing  Cardiovascular ROS: no chest pain or dyspnea on exertion  Gastrointestinal ROS: no abdominal pain, change in bowel habits, or black or bloody stools  Genito-Urinary ROS: no dysuria, trouble voiding, or hematuria  Musculoskeletal ROS: negative  positive for - pain in back - bilateral  Dermatological ROS: negative      PHYSICAL EXAMINATION:    Visit Vitals  /84   Pulse 91   Resp 16   Ht 5' 1\" (1.549 m)   Wt 79.4 kg (175 lb)   SpO2 98%   BMI 33.07 kg/m²     General:  Well defined, nourished, and groomed individual in no acute distress. Neck: Supple, nontender, no bruits, no pain with resistance to active range of motion. Heart: Regular rate and rhythm, no murmurs, rub, or gallop. Normal S1S2. Lungs:  Equal chest expansion, no cough, no wheeze  Musculoskeletal:  Extremities revealed no edema and had full range of motion of joints. Psych:  Good mood and bright affect    NEUROLOGICAL EXAMINATION:     Mental Status:   Alert and oriented to person, place, and time with recent and remote memory intact. Attention span and concentration are normal. Speech is fluent with a full fund of knowledge. Cranial Nerves:    II, III, IV, VI:  Visual acuity grossly intact. Visual fields are normal.    Pupils are equal, round, and reactive to light and accommodation. Extra-ocular movements are full and fluid. V-XII: Hearing is grossly intact. Facial features are symmetric, with normal sensation and strength. The palate rises symmetrically and the tongue protrudes midline. Sternocleidomastoids 5/5. Motor Examination: Normal tone, bulk, and strength. 5/5 muscle strength throughout except trace weakness of great toe extension on the right when compared to the left. No cogwheel rigidity or clonus present. Sensory exam:  Normal throughout to pinprick, temperature, and vibration sense. Normal proprioception. Coordination: Finger to nose and rapid arm movement testing was normal.   No resting or intention tremor    Gait and Station:  Steady while walking on toes, heels, and with tandem walking. Normal arm swing. No Rhomberg or pronator drift. No muscle wasting or fasiculations noted. Reflexes:  DTRs 2+ throughout. Toes downgoing.         LABS / IMAGING  Lab Results   Component Value Date/Time    WBC 9.2 06/02/2019 06:31 AM    HGB 14.6 06/02/2019 06:31 AM    HCT 43.0 06/02/2019 06:31 AM    PLATELET 504 71/64/2826 06:31 AM    MCV 89.0 06/02/2019 06:31 AM     Lab Results   Component Value Date/Time    Sodium 137 06/02/2019 06:31 AM    Potassium 3.9 06/02/2019 06:31 AM    Chloride 104 06/02/2019 06:31 AM    CO2 26 06/02/2019 06:31 AM    Anion gap 7 06/02/2019 06:31 AM    Glucose 242 (H) 06/02/2019 06:31 AM    BUN 9 06/02/2019 06:31 AM    Creatinine 0.87 06/02/2019 06:31 AM    BUN/Creatinine ratio 10 (L) 06/02/2019 06:31 AM    GFR est AA >60 06/02/2019 06:31 AM    GFR est non-AA >60 06/02/2019 06:31 AM    Calcium 8.9 06/02/2019 06:31 AM    Bilirubin, total 0.3 06/02/2019 06:31 AM    AST (SGOT) 9 (L) 06/02/2019 06:31 AM    Alk. phosphatase 115 06/02/2019 06:31 AM    Protein, total 6.6 06/02/2019 06:31 AM    Albumin 3.5 06/02/2019 06:31 AM    Globulin 3.1 06/02/2019 06:31 AM    A-G Ratio 1.1 06/02/2019 06:31 AM    ALT (SGPT) 36 06/02/2019 06:31 AM         ASSESSMENT    ICD-10-CM ICD-9-CM    1. Chronic bilateral low back pain with right-sided sciatica M54.41 724.2 MRI LUMB SPINE WO CONT    G89.29 724.3      338.29        DISCUSSION  Ms. Clayton Alfred has chronic low back for the past 30 years and it seems to be getting worse now, interfering with her daily activities, walking and balance. She has trace weakness of big toe extensors on the right. Pain continues despite regular home exercise regimen I have recommended. MRI scan of the lumbar spine to assess for degenerative disc and joint disease and to decide further treatment. She may need referral to pain management for injection depending upon the findings. Thank you for allowing me to participate in the care of Ms. Baeza. Please feel free to contact me if you have any questions. I will be happy to follow to follow her along with you.       Niko Kaufman MD  Diplomate, American Board of Psychiatry & Neurology (Neurology)  [facility] Board of Psychiatry & Neurology (Clinical Neurophysiology)  Diplomate, American Board of Electrodiagnostic Medicine  This note will not be viewable in [address] I will SWITCH the dose or number of times a day I take the medications listed below when I get home from the hospital:  None

## 2019-10-22 NOTE — PROGRESS NOTES
Ms. Ivette Morales presents as a new patient for evaluation of possible sciatica. Depression screening done on patient.

## 2019-10-31 ENCOUNTER — OFFICE VISIT (OUTPATIENT)
Dept: PRIMARY CARE CLINIC | Age: 59
End: 2019-10-31

## 2019-10-31 VITALS
HEIGHT: 61 IN | RESPIRATION RATE: 16 BRPM | DIASTOLIC BLOOD PRESSURE: 85 MMHG | BODY MASS INDEX: 32.85 KG/M2 | OXYGEN SATURATION: 99 % | SYSTOLIC BLOOD PRESSURE: 148 MMHG | TEMPERATURE: 99 F | HEART RATE: 94 BPM | WEIGHT: 174 LBS

## 2019-10-31 DIAGNOSIS — F90.9 ATTENTION DEFICIT HYPERACTIVITY DISORDER (ADHD), UNSPECIFIED ADHD TYPE: ICD-10-CM

## 2019-10-31 RX ORDER — METHYLPHENIDATE HYDROCHLORIDE 10 MG/1
10 TABLET ORAL 3 TIMES DAILY
Qty: 90 TAB | Refills: 0 | Status: SHIPPED | OUTPATIENT
Start: 2019-10-31 | End: 2019-10-31 | Stop reason: SDUPTHER

## 2019-10-31 RX ORDER — METHYLPHENIDATE HYDROCHLORIDE 10 MG/1
10 TABLET ORAL 3 TIMES DAILY
Qty: 90 TAB | Refills: 0 | Status: SHIPPED | OUTPATIENT
Start: 2019-11-30

## 2019-10-31 NOTE — PROGRESS NOTES
Chief Complaint   Patient presents with    Medication Evaluation     ritilin refill     1. Have you been to the ER, urgent care clinic since your last visit? Hospitalized since your last visit? No    2. Have you seen or consulted any other health care providers outside of the [address] [place] [address] since your last visit? Include any pap smears or colon screening.  No

## 2019-10-31 NOTE — PROGRESS NOTES
Alyson Lopez is a 60 y.o.  female and presents with     Chief Complaint   Patient presents with    Medication Evaluation     ritalin refill needed     Diabetes     follow up    Blood Pressure Check    Behavioral Problem   Patient is here for a follow-up. She has symptoms of sciatica and was seen by neurologist who ordered MRI of the lumbar sacral spine. She is also here for refills on her medicines for ADHD. Patient reports that her diabetes control has been okay. She does go to alternative medicine doctor who gives her testosterone injections. And also monitors her blood work            Past Medical History:   Diagnosis Date    Asthma     Reyes's esophagus     Diabetes ([address]     Macular degeneration, right eye     Psychiatric disorder     depression     Past Surgical History:   Procedure Laterality Date    HX CHOLECYSTECTOMY      HX HEENT      tonsils     Current Outpatient Medications   Medication Sig    [START ON 11/30/2019] methylphenidate HCl (RITALIN) 10 mg tablet Take 1 Tab by mouth three (3) times daily. Max Daily Amount: 30 mg.    traZODone (DESYREL) 50 mg tablet Take 1 Tab by mouth nightly.  simvastatin (ZOCOR) 20 mg tablet Take 1 Tab by mouth nightly.  insulin glargine (LANTUS,BASAGLAR) 100 unit/mL (3 mL) inpn Administer 20 units subcut once daily    Insulin Needles, Disposable, 30 gauge x 1/3\" DM    dulaglutide (TRULICITY) 1.5 CY/4.3 mL sub-q pen 0.5 mL by SubCUTAneous route every seven (7) days.  traMADol (ULTRAM) 50 mg tablet Take 25 mg by mouth every six (6) hours as needed for Pain.  NEXIUM 40 mg capsule TAKE 1 CAPSULE BY MOUTH TWICE A DAY    TESTOSTERONE, BULK, testosterone    VENTOLIN HFA 90 mcg/actuation inhaler INHALE 2 PUFFS EVERY 4 HOURS AS NEEDED FOR WHEEZING    BD INTEGRA SYRINGE 3 mL 25 gauge x 1\" syrg USE WITH VITAMIN B12 INJECTION WEEKLY    valACYclovir (VALTREX) 500 mg tablet as needed.     nicotine (NICODERM CQ) 21 mg/24 hr 1 Patch by TransDERmal route every twenty-four (24) hours.  metFORMIN ER (GLUCOPHAGE XR) 750 mg tablet Take 1,500 mg by mouth daily.  ibuprofen (MOTRIN) 800 mg tablet Take 1 Tab by mouth every eight (8) hours as needed for Pain. No current facility-administered medications for this visit. Health Maintenance   Topic Date Due    Pneumococcal 0-64 years (1 of 1 - PPSV23) 09/01/1965    FOOT EXAM Q1  09/01/1969    EYE EXAM RETINAL OR DILATED  09/01/1969    DTaP/Tdap/Td series (1 - Tdap) 09/01/1980    Shingrix Vaccine Age 50> (1 of 2) 09/01/2009    FOBT Q 1 YEAR AGE 50-75  09/01/2009    Influenza Age 9 to Adult  08/01/2019    HEMOGLOBIN A1C Q6M  02/19/2020    MICROALBUMIN Q1  04/17/2020    LIPID PANEL Q1  04/17/2020    BREAST CANCER SCRN MAMMOGRAM  03/05/2021    PAP AKA CERVICAL CYTOLOGY  01/23/2022    Hepatitis C Screening  Completed     Immunization History   Administered Date(s) Administered    Influenza Vaccine 10/10/2019    Zoster Vaccine, Live 10/10/2019     No LMP recorded. Patient is postmenopausal.        Allergies and Intolerances: Allergies   Allergen Reactions    Vioxx [Rofecoxib] Other (comments)       Family History:   No family history on file. Social History:   She  reports that she has quit smoking. She has never used smokeless tobacco.  She  reports that she drinks alcohol.             Review of Systems:   General: negative for - chills, fatigue, fever, weight change  Psych: negative for - anxiety, depression, irritability or mood swings  ENT: negative for - headaches, hearing change, nasal congestion, oral lesions, sneezing or sore throat  Heme/ Lymph: negative for - bleeding problems, bruising, pallor or swollen lymph nodes  Endo: negative for - hot flashes, polydipsia/polyuria or temperature intolerance  Resp: negative for - cough, shortness of breath or wheezing  CV: negative for - chest pain, edema or palpitations  GI: negative for - abdominal pain, change in bowel habits, constipation, diarrhea or nausea/vomiting  : negative for - dysuria, hematuria, incontinence, pelvic pain or vulvar/vaginal symptoms  MSK: negative for - joint pain, joint swelling or muscle pain  Neuro: negative for - confusion, headaches, seizures or weakness  Derm: negative for - dry skin, hair changes, rash or skin lesion changes          Physical:   Vitals:   Vitals:    10/31/19 1257   BP: 148/85   Pulse: 94   Resp: 16   Temp: 99 °F (37.2 °C)   TempSrc: Oral   SpO2: 99%   Weight: 174 lb (78.9 kg)   Height: 5' 1\" (1.549 m)           Exam:   HEENT- atraumatic,normocephalic, awake, oriented, well nourished  Neck - supple,no enlarged lymph nodes, no JVD, no thyromegaly  Chest- CTA, no rhonchi, no crackles  Heart- rrr, no murmurs / gallop/rub  Abdomen- soft,BS+,NT, no hepatosplenomegaly  Ext - no c/c/edema   Neuro- no focal deficits. Power 5/5 all extremities  Skin - warm,dry, no obvious rashes. Review of Data:   LABS:   Lab Results   Component Value Date/Time    WBC 9.2 06/02/2019 06:31 AM    HGB 14.6 06/02/2019 06:31 AM    HCT 43.0 06/02/2019 06:31 AM    PLATELET 168 84/43/3981 06:31 AM     Lab Results   Component Value Date/Time    Sodium 137 06/02/2019 06:31 AM    Potassium 3.9 06/02/2019 06:31 AM    Chloride 104 06/02/2019 06:31 AM    CO2 26 06/02/2019 06:31 AM    Glucose 242 (H) 06/02/2019 06:31 AM    BUN 9 06/02/2019 06:31 AM    Creatinine 0.87 06/02/2019 06:31 AM     Lab Results   Component Value Date/Time    Cholesterol, total 146 04/17/2019 02:13 PM    HDL Cholesterol 34 (L) 04/17/2019 02:13 PM    LDL, calculated 86 04/17/2019 02:13 PM    Triglyceride 128 04/17/2019 02:13 PM     No results found for: GPT        Impression / Plan:        ICD-10-CM ICD-9-CM    1. Attention deficit hyperactivity disorder (ADHD), unspecified ADHD type F90.9 314.01 methylphenidate HCl (RITALIN) 10 mg tablet      DISCONTINUED: methylphenidate HCl (RITALIN) 10 mg tablet      reviewed.     Diabetes-asked patient to monitor blood sugars. Explained to patient risk benefits of the medications. Advised patient to stop meds if having any side effects. Pt verbalized understanding of the instructions. I have discussed the diagnosis with the patient and the intended plan as seen in the above orders. The patient has received an after-visit summary and questions were answered concerning future plans. I have discussed medication side effects and warnings with the patient as well. I have reviewed the plan of care with the patient, accepted their input and they are in agreement with the treatment goals. Reviewed plan of care. Patient has provided input and agrees with goals. Follow-up and Dispositions    · Return in about 2 months (around 12/31/2019).          Marcellus Reagan MD

## 2019-11-05 ENCOUNTER — HOSPITAL ENCOUNTER (EMERGENCY)
Age: 59
Discharge: HOME | End: 2019-11-05
Attending: EMERGENCY MEDICINE
Payer: COMMERCIAL

## 2019-11-05 ENCOUNTER — APPOINTMENT (OUTPATIENT)
Dept: GENERAL RADIOLOGY | Age: 59
End: 2019-11-05
Attending: EMERGENCY MEDICINE
Payer: COMMERCIAL

## 2019-11-05 VITALS
SYSTOLIC BLOOD PRESSURE: 130 MMHG | HEART RATE: 115 BPM | HEIGHT: 61 IN | DIASTOLIC BLOOD PRESSURE: 69 MMHG | WEIGHT: 176.37 LBS | BODY MASS INDEX: 33.3 KG/M2 | TEMPERATURE: 98.2 F | OXYGEN SATURATION: 93 %

## 2019-11-05 DIAGNOSIS — J20.9 ACUTE BRONCHITIS, UNSPECIFIED ORGANISM: Primary | ICD-10-CM

## 2019-11-05 LAB
ALBUMIN SERPL-MCNC: 3.5 G/DL (ref 3.5–5)
ALBUMIN/GLOB SERPL: 0.9 {RATIO} (ref 1.1–2.2)
ALP SERPL-CCNC: 131 U/L (ref 45–117)
ALT SERPL-CCNC: 35 U/L (ref 12–78)
ANION GAP SERPL CALC-SCNC: 11 MMOL/L (ref 5–15)
APPEARANCE UR: CLEAR
AST SERPL-CCNC: 10 U/L (ref 15–37)
BACTERIA URNS QL MICRO: NEGATIVE /HPF
BASOPHILS # BLD: 0.1 K/UL (ref 0–0.1)
BASOPHILS NFR BLD: 0 % (ref 0–1)
BILIRUB SERPL-MCNC: 0.7 MG/DL (ref 0.2–1)
BILIRUB UR QL: NEGATIVE
BUN SERPL-MCNC: 10 MG/DL (ref 6–20)
BUN/CREAT SERPL: 13 (ref 12–20)
CALCIUM SERPL-MCNC: 9 MG/DL (ref 8.5–10.1)
CHLORIDE SERPL-SCNC: 95 MMOL/L (ref 97–108)
CO2 SERPL-SCNC: 24 MMOL/L (ref 21–32)
COLOR UR: ABNORMAL
CREAT SERPL-MCNC: 0.78 MG/DL (ref 0.55–1.02)
DIFFERENTIAL METHOD BLD: ABNORMAL
EOSINOPHIL # BLD: 0.2 K/UL (ref 0–0.4)
EOSINOPHIL NFR BLD: 1 % (ref 0–7)
EPITH CASTS URNS QL MICRO: NORMAL /LPF
ERYTHROCYTE [DISTWIDTH] IN BLOOD BY AUTOMATED COUNT: 12.6 % (ref 11.5–14.5)
FLUAV AG NPH QL IA: NEGATIVE
FLUBV AG NOSE QL IA: NEGATIVE
GLOBULIN SER CALC-MCNC: 3.8 G/DL (ref 2–4)
GLUCOSE SERPL-MCNC: 300 MG/DL (ref 65–100)
GLUCOSE UR STRIP.AUTO-MCNC: >1000 MG/DL
HCT VFR BLD AUTO: 42.6 % (ref 35–47)
HGB BLD-MCNC: 15 G/DL (ref 11.5–16)
HGB UR QL STRIP: ABNORMAL
IMM GRANULOCYTES # BLD AUTO: 0.1 K/UL (ref 0–0.04)
IMM GRANULOCYTES NFR BLD AUTO: 1 % (ref 0–0.5)
KETONES UR QL STRIP.AUTO: NEGATIVE MG/DL
LACTATE SERPL-SCNC: 1.6 MMOL/L (ref 0.4–2)
LEUKOCYTE ESTERASE UR QL STRIP.AUTO: NEGATIVE
LYMPHOCYTES # BLD: 1.7 K/UL (ref 0.8–3.5)
LYMPHOCYTES NFR BLD: 11 % (ref 12–49)
MCH RBC QN AUTO: 30.4 PG (ref 26–34)
MCHC RBC AUTO-ENTMCNC: 35.2 G/DL (ref 30–36.5)
MCV RBC AUTO: 86.4 FL (ref 80–99)
MONOCYTES # BLD: 0.7 K/UL (ref 0–1)
MONOCYTES NFR BLD: 5 % (ref 5–13)
NEUTS SEG # BLD: 13.2 K/UL (ref 1.8–8)
NEUTS SEG NFR BLD: 82 % (ref 32–75)
NITRITE UR QL STRIP.AUTO: NEGATIVE
NRBC # BLD: 0 K/UL (ref 0–0.01)
NRBC BLD-RTO: 0 PER 100 WBC
PH UR STRIP: 7 [PH] (ref 5–8)
PLATELET # BLD AUTO: 225 K/UL (ref 150–400)
PMV BLD AUTO: 9.6 FL (ref 8.9–12.9)
POTASSIUM SERPL-SCNC: 3.8 MMOL/L (ref 3.5–5.1)
PROT SERPL-MCNC: 7.3 G/DL (ref 6.4–8.2)
PROT UR STRIP-MCNC: NEGATIVE MG/DL
RBC # BLD AUTO: 4.93 M/UL (ref 3.8–5.2)
RBC #/AREA URNS HPF: NORMAL /HPF (ref 0–5)
SODIUM SERPL-SCNC: 130 MMOL/L (ref 136–145)
SP GR UR REFRACTOMETRY: 1.02 (ref 1–1.03)
UROBILINOGEN UR QL STRIP.AUTO: 0.2 EU/DL (ref 0.2–1)
WBC # BLD AUTO: 16 K/UL (ref 3.6–11)
WBC URNS QL MICRO: NORMAL /HPF (ref 0–4)

## 2019-11-05 PROCEDURE — 74011000250 HC RX REV CODE- 250: Performed by: EMERGENCY MEDICINE

## 2019-11-05 PROCEDURE — 74011250637 HC RX REV CODE- 250/637: Performed by: EMERGENCY MEDICINE

## 2019-11-05 PROCEDURE — 83605 ASSAY OF LACTIC ACID: CPT

## 2019-11-05 PROCEDURE — 94640 AIRWAY INHALATION TREATMENT: CPT

## 2019-11-05 PROCEDURE — 74011636637 HC RX REV CODE- 636/637: Performed by: EMERGENCY MEDICINE

## 2019-11-05 PROCEDURE — 96374 THER/PROPH/DIAG INJ IV PUSH: CPT

## 2019-11-05 PROCEDURE — 85025 COMPLETE CBC W/AUTO DIFF WBC: CPT

## 2019-11-05 PROCEDURE — 74011250636 HC RX REV CODE- 250/636: Performed by: EMERGENCY MEDICINE

## 2019-11-05 PROCEDURE — 99285 EMERGENCY DEPT VISIT HI MDM: CPT

## 2019-11-05 PROCEDURE — 80053 COMPREHEN METABOLIC PANEL: CPT

## 2019-11-05 PROCEDURE — 87804 INFLUENZA ASSAY W/OPTIC: CPT

## 2019-11-05 PROCEDURE — 81001 URINALYSIS AUTO W/SCOPE: CPT

## 2019-11-05 PROCEDURE — 71046 X-RAY EXAM CHEST 2 VIEWS: CPT

## 2019-11-05 PROCEDURE — 36415 COLL VENOUS BLD VENIPUNCTURE: CPT

## 2019-11-05 RX ORDER — KETOROLAC TROMETHAMINE 30 MG/ML
30 INJECTION, SOLUTION INTRAMUSCULAR; INTRAVENOUS ONCE
Status: COMPLETED | OUTPATIENT
Start: 2019-11-05 | End: 2019-11-05

## 2019-11-05 RX ORDER — PREDNISONE 50 MG/1
50 TABLET ORAL DAILY
Qty: 3 TAB | Refills: 0 | Status: SHIPPED | OUTPATIENT
Start: 2019-11-05 | End: 2019-11-08

## 2019-11-05 RX ORDER — ALBUTEROL SULFATE 0.83 MG/ML
2.5 SOLUTION RESPIRATORY (INHALATION)
Qty: 30 NEBULE | Refills: 0 | Status: SHIPPED | OUTPATIENT
Start: 2019-11-05

## 2019-11-05 RX ORDER — AZITHROMYCIN 250 MG/1
250 TABLET, FILM COATED ORAL DAILY
Qty: 4 TAB | Refills: 0 | Status: SHIPPED | OUTPATIENT
Start: 2019-11-05

## 2019-11-05 RX ORDER — PREDNISONE 20 MG/1
60 TABLET ORAL
Status: COMPLETED | OUTPATIENT
Start: 2019-11-05 | End: 2019-11-05

## 2019-11-05 RX ORDER — IPRATROPIUM BROMIDE AND ALBUTEROL SULFATE 2.5; .5 MG/3ML; MG/3ML
3 SOLUTION RESPIRATORY (INHALATION)
Status: COMPLETED | OUTPATIENT
Start: 2019-11-05 | End: 2019-11-05

## 2019-11-05 RX ORDER — AZITHROMYCIN 250 MG/1
500 TABLET, FILM COATED ORAL
Status: COMPLETED | OUTPATIENT
Start: 2019-11-05 | End: 2019-11-05

## 2019-11-05 RX ORDER — NEBULIZER AND COMPRESSOR
1 EACH MISCELLANEOUS
Qty: 1 EACH | Refills: 0 | Status: SHIPPED | OUTPATIENT
Start: 2019-11-05

## 2019-11-05 RX ADMIN — IPRATROPIUM BROMIDE AND ALBUTEROL SULFATE 3 ML: .5; 3 SOLUTION RESPIRATORY (INHALATION) at 20:37

## 2019-11-05 RX ADMIN — PREDNISONE 60 MG: 20 TABLET ORAL at 22:22

## 2019-11-05 RX ADMIN — SODIUM CHLORIDE 1000 ML: 900 INJECTION, SOLUTION INTRAVENOUS at 20:03

## 2019-11-05 RX ADMIN — AZITHROMYCIN MONOHYDRATE 500 MG: 250 TABLET ORAL at 22:09

## 2019-11-05 RX ADMIN — IPRATROPIUM BROMIDE AND ALBUTEROL SULFATE 3 ML: .5; 3 SOLUTION RESPIRATORY (INHALATION) at 20:05

## 2019-11-05 RX ADMIN — IPRATROPIUM BROMIDE AND ALBUTEROL SULFATE 3 ML: .5; 3 SOLUTION RESPIRATORY (INHALATION) at 21:06

## 2019-11-05 RX ADMIN — KETOROLAC TROMETHAMINE 30 MG: 30 INJECTION, SOLUTION INTRAMUSCULAR at 20:04

## 2019-11-06 NOTE — ED PROVIDER NOTES
70-year-old female with past medical history significant for diabetes, asthma presents with complaints of 4 days nonproductive cough with shortness of breath. Patient reports she initially had cough and fever with body aches 4 days ago. Fever improved but then returned today along with increased body aches. Also associated with headache and lower rib pain secondary to coughing. Patient also reports urinary urgency without dysuria/hematuria. Minimal relief of cough and shortness of breath with albuterol inhaler at home. No fever medication taken prior to arrival.  Patient reports quitting smoking on October 31, 2019. History of tobacco use  Denies drug and alcohol use  Primary care physician darshana           Past Medical History:   Diagnosis Date    Asthma     Reyes's esophagus     Diabetes ([address]     Macular degeneration, right eye     Psychiatric disorder     depression       Past Surgical History:   Procedure Laterality Date    HX CHOLECYSTECTOMY      HX HEENT      tonsils         History reviewed. No pertinent family history.     Social History     Socioeconomic History    Marital status:      Spouse name: Not on file    Number of children: Not on file    Years of education: Not on file    Highest education level: Not on file   Occupational History    Not on file   Social Needs    Financial resource strain: Not on file    Food insecurity:     Worry: Not on file     Inability: Not on file    Transportation needs:     Medical: Not on file     Non-medical: Not on file   Tobacco Use    Smoking status: Former Smoker    Smokeless tobacco: Former User     Quit date: 10/31/2019   Substance and Sexual Activity    Alcohol use: Yes     Comment: occ    Drug use: No    Sexual activity: Not on file   Lifestyle    Physical activity:     Days per week: Not on file     Minutes per session: Not on file    Stress: Not on file   Relationships    Social connections:     Talks on phone: Not on file     Gets together: Not on file     Attends L[employer]-day service: Not on file     Active member of club or organization: Not on file     Attends meetings of clubs or organizations: Not on file     Relationship status: Not on file    Intimate partner violence:     Fear of current or ex partner: Not on file     Emotionally abused: Not on file     Physically abused: Not on file     Forced sexual activity: Not on file   Other Topics Concern    Not on file   Social History Narrative    Not on file         ALLERGIES: Vioxx [rofecoxib]    Review of Systems   Constitutional: Positive for fatigue and fever. Negative for chills. HENT: Negative for congestion, nosebleeds and rhinorrhea. Eyes: Negative for pain and redness. Respiratory: Positive for cough, chest tightness and shortness of breath. Cardiovascular: Negative for chest pain and palpitations. Gastrointestinal: Negative for abdominal pain, nausea and vomiting. Genitourinary: Negative for dysuria, frequency, vaginal bleeding and vaginal pain. Musculoskeletal: Positive for myalgias. Skin: Negative for rash and wound. Neurological: Positive for headaches. Negative for seizures, syncope and weakness. Hematological: Does not bruise/bleed easily. Psychiatric/Behavioral: Negative for agitation, confusion, dysphoric mood and suicidal ideas. The patient is not nervous/anxious. Vitals:    11/05/19 2230 11/05/19 2231 11/05/19 2245 11/05/19 2300   BP: 132/75  131/68 130/69   Pulse:       Temp:       SpO2: 94% 95% 93% 93%   Weight:       Height:                Physical Exam   Constitutional: She is oriented to person, place, and time. She appears well-developed and well-nourished. HENT:   Head: Normocephalic and atraumatic. Eyes: Pupils are equal, round, and reactive to light. EOM are normal.   Neck: Normal range of motion. Neck supple. No tracheal deviation present.    Cardiovascular: Normal rate, regular rhythm, normal heart sounds and intact distal pulses. Pulmonary/Chest: Effort normal. No stridor. No respiratory distress. She has decreased breath sounds. She has no wheezes. She has no rales. She exhibits no tenderness. Abdominal: Soft. Bowel sounds are normal. She exhibits no distension. There is no tenderness. There is no rebound. Musculoskeletal: Normal range of motion. She exhibits no edema or tenderness. Neurological: She is alert and oriented to person, place, and time. No cranial nerve deficit. Skin: Skin is warm and dry. No rash noted. No pallor. Psychiatric: She has a normal mood and affect. Nursing note and vitals reviewed. MDM  Number of Diagnoses or Management Options  Acute bronchitis, unspecified organism:   Diagnosis management comments: 65-year-old female with history of asthma, diabetes presents with complaints of cough and fever with headache and body aches. Patient is febrile, nontoxic, well-appearing, no respiratory distress or increased work of breathing, diminished breath sounds bilaterally, 94%  on room air. Plannebulizer therapy, chest x-ray, cardiac monitor, CBC/CMP/lactate, IV fluid hydration, prednisone, azithromycin, fever control. Labs remarkable for WBC 16      Chest x-ray unremarkable       Amount and/or Complexity of Data Reviewed  Clinical lab tests: ordered and reviewed  Tests in the radiology section of CPT®: ordered and reviewed  Independent visualization of images, tracings, or specimens: yes    Patient Progress  Patient progress: improved         Procedures    9 PM patient reevaluation after second breathing treatment. Patient reports breathing much improved. Clear to auscultation bilaterally. No respiratory distress, respiratory rate 16. In complete sentences, 96% on room air. Chest x-ray labs reviewed with patient. Agreeable with plan for steroid medication and antibiotics for bronchitis. 11 PM  Patient resting comfortably. No distress, no increased work of breathing. Agreeable with discharge with albuterol nebulizer therapy, prednisone, and azithromycin. Advised to follow-up with primary care physician. Patient expresses understanding and agreeable with plan.

## 2019-11-06 NOTE — ED TRIAGE NOTES
Patient ambulatory to ER for c/o \"I came down with the flu on Friday and it was getting better, I am asthmatic and my inhaler was not working. I have a UTI. \" No dx with flu by MD. Symptoms: headache, aches, fever pain, coughing per patient. \"MIld fever\". +fever at triage 100.8, no meds pta.

## 2019-11-06 NOTE — ED NOTES
Patient resting in stretcher in no distress, VSS. Receiving second breathing treatment at this time. Reports feeling relief.

## 2019-11-06 NOTE — ED NOTES
Patient finished 3rd breathing treatment at this time, feeling much better. Lungs remain clear. VSS and updated.

## 2019-11-06 NOTE — DISCHARGE INSTRUCTIONS

## 2019-11-06 NOTE — ED NOTES
I have reviewed discharge instructions with the patient. The patient verbalized understanding. Pt ambulatory to vehicle. Nad. No c/o.

## 2019-11-07 ENCOUNTER — TELEPHONE (OUTPATIENT)
Dept: PRIMARY CARE CLINIC | Age: 59
End: 2019-11-07

## 2019-11-07 ENCOUNTER — HOSPITAL ENCOUNTER (OUTPATIENT)
Dept: MRI IMAGING | Age: 59
Discharge: HOME | End: 2019-11-07
Attending: PSYCHIATRY & NEUROLOGY
Payer: COMMERCIAL

## 2019-11-07 DIAGNOSIS — G89.29 CHRONIC BILATERAL LOW BACK PAIN WITH RIGHT-SIDED SCIATICA: ICD-10-CM

## 2019-11-07 DIAGNOSIS — M54.41 CHRONIC BILATERAL LOW BACK PAIN WITH RIGHT-SIDED SCIATICA: ICD-10-CM

## 2019-11-07 PROCEDURE — 72148 MRI LUMBAR SPINE W/O DYE: CPT

## 2019-11-07 NOTE — TELEPHONE ENCOUNTER
Patient called requesting appt. With Dr. Robert Miller this afternoon due to having difficulty breathing. No appointments available. States she was seen in the ER and needed follow up ASAP since she was still having trouble breathing. Put on hold to get nurse and patient hung up. Please follow up.

## 2019-11-07 NOTE — TELEPHONE ENCOUNTER
Pt states that she went to ER on 11/5/19 for SOB. Was told that she may have emphysema and need to see a pulmonologist for evaluation.  Pt made appt for tomorrow for evaluation and to get referral.

## 2019-11-08 ENCOUNTER — DOCUMENTATION ONLY (OUTPATIENT)
Dept: NEUROLOGY | Age: 59
End: 2019-11-08

## 2019-11-08 ENCOUNTER — OFFICE VISIT (OUTPATIENT)
Dept: PRIMARY CARE CLINIC | Age: 59
End: 2019-11-08

## 2019-11-08 VITALS
OXYGEN SATURATION: 96 % | BODY MASS INDEX: 32.97 KG/M2 | HEIGHT: 61 IN | SYSTOLIC BLOOD PRESSURE: 141 MMHG | HEART RATE: 80 BPM | DIASTOLIC BLOOD PRESSURE: 86 MMHG | TEMPERATURE: 98.5 F | WEIGHT: 174.6 LBS | RESPIRATION RATE: 18 BRPM

## 2019-11-08 DIAGNOSIS — J44.1 COPD EXACERBATION (HCC): Primary | ICD-10-CM

## 2019-11-08 DIAGNOSIS — R05.9 COUGH: ICD-10-CM

## 2019-11-08 DIAGNOSIS — M51.9 LUMBAR DISC DISEASE: Primary | ICD-10-CM

## 2019-11-08 RX ORDER — GUAIFENESIN 600 MG/1
600 TABLET, EXTENDED RELEASE ORAL 2 TIMES DAILY
Qty: 14 TAB | Refills: 0 | Status: SHIPPED | OUTPATIENT
Start: 2019-11-08

## 2019-11-08 RX ORDER — ALBUTEROL SULFATE 90 UG/1
2 AEROSOL, METERED RESPIRATORY (INHALATION)
Qty: 1 INHALER | Refills: 3 | Status: SHIPPED | OUTPATIENT
Start: 2019-11-08

## 2019-11-08 RX ORDER — BENZONATATE 100 MG/1
100 CAPSULE ORAL
Qty: 30 CAP | Refills: 3 | Status: SHIPPED | OUTPATIENT
Start: 2019-11-08

## 2019-11-08 RX ORDER — BUDESONIDE AND FORMOTEROL FUMARATE DIHYDRATE 160; 4.5 UG/1; UG/1
2 AEROSOL RESPIRATORY (INHALATION) 2 TIMES DAILY
Qty: 1 INHALER | Refills: 3 | Status: SHIPPED | OUTPATIENT
Start: 2019-11-08

## 2019-11-08 NOTE — PROGRESS NOTES
Please inform that MRI showed a mild disc bulge which could be causing pressure on the nerve root in the lower back. I suggest that she see pain management/Dr. Naty Dawson to see if she would be a candidate for a localized injection.   Referral printed

## 2019-11-08 NOTE — PROGRESS NOTES
Chief Complaint   Patient presents with   [facility] Follow Up     acute bronch       1. Have you been to the ER, urgent care clinic since your last visit? Hospitalized since your last visit? Yes When: 10/2019 Where: [facility]    2. Have you seen or consulted any other health care providers outside of the [address] [place] [address] since your last visit? Include any pap smears or colon screening.  No

## 2019-11-11 NOTE — PROGRESS NOTES
Rigoberto Wright is a 60 y.o.  female and presents with     Chief Complaint   Patient presents with   [facility] Follow Up     acute bronch       Pt was seen in ER for cough. She was told she has bronchitis. Pt does have h/o smoking. Past Medical History:   Diagnosis Date    Asthma     Reyes's esophagus     Diabetes ([address]     Macular degeneration, right eye     Psychiatric disorder     depression     Past Surgical History:   Procedure Laterality Date    HX CHOLECYSTECTOMY      HX HEENT      tonsils     Current Outpatient Medications   Medication Sig    albuterol (PROVENTIL HFA, VENTOLIN HFA, PROAIR HFA) 90 mcg/actuation inhaler Take 2 Puffs by inhalation every six (6) hours as needed for Wheezing.  budesonide-formoterol (SYMBICORT) 160-4.5 mcg/actuation HFAA Take 2 Puffs by inhalation two (2) times a day.  benzonatate (TESSALON PERLES) 100 mg capsule Take 1 Cap by mouth three (3) times daily as needed for Cough.  guaiFENesin ER (MUCINEX) 600 mg ER tablet Take 1 Tab by mouth two (2) times a day.  azithromycin (ZITHROMAX Z-YAO) 250 mg tablet Take 1 Tab by mouth daily. Take 1 tab per day starting tomorrow.  albuterol (PROVENTIL VENTOLIN) 2.5 mg /3 mL (0.083 %) nebu 3 mL by Nebulization route every four (4) hours as needed for Cough or Other (shortness of breath).  Nebulizer & Compressor machine 1 Each by Does Not Apply route every four (4) hours as needed for Cough.  Nebulizer Accessories kit Use with nebulizer machine    [START ON 11/30/2019] methylphenidate HCl (RITALIN) 10 mg tablet Take 1 Tab by mouth three (3) times daily. Max Daily Amount: 30 mg.    traZODone (DESYREL) 50 mg tablet Take 1 Tab by mouth nightly.  simvastatin (ZOCOR) 20 mg tablet Take 1 Tab by mouth nightly.     insulin glargine (LANTUS,BASAGLAR) 100 unit/mL (3 mL) inpn Administer 20 units subcut once daily    Insulin Needles, Disposable, 30 gauge x 1/3\" DM    dulaglutide (TRULICITY) 1.5 QJ/4.5 mL sub-q pen 0.5 mL by SubCUTAneous route every seven (7) days.  traMADol (ULTRAM) 50 mg tablet Take 25 mg by mouth every six (6) hours as needed for Pain.  ibuprofen (MOTRIN) 800 mg tablet Take 1 Tab by mouth every eight (8) hours as needed for Pain.  NEXIUM 40 mg capsule TAKE 1 CAPSULE BY MOUTH TWICE A DAY    TESTOSTERONE, BULK, testosterone    VENTOLIN HFA 90 mcg/actuation inhaler INHALE 2 PUFFS EVERY 4 HOURS AS NEEDED FOR WHEEZING    BD INTEGRA SYRINGE 3 mL 25 gauge x 1\" syrg USE WITH VITAMIN B12 INJECTION WEEKLY    valACYclovir (VALTREX) 500 mg tablet as needed.  nicotine (NICODERM CQ) 21 mg/24 hr 1 Patch by TransDERmal route every twenty-four (24) hours.  metFORMIN ER (GLUCOPHAGE XR) 750 mg tablet Take 1,500 mg by mouth daily. No current facility-administered medications for this visit. Health Maintenance   Topic Date Due    Pneumococcal 0-64 years (1 of 1 - PPSV23) 09/01/1965    FOOT EXAM Q1  09/01/1969    EYE EXAM RETINAL OR DILATED  09/01/1969    DTaP/Tdap/Td series (1 - Tdap) 09/01/1980    Shingrix Vaccine Age 50> (1 of 2) 09/01/2009    FOBT Q 1 YEAR AGE 50-75  09/01/2009    HEMOGLOBIN A1C Q6M  02/19/2020    MICROALBUMIN Q1  04/17/2020    LIPID PANEL Q1  04/17/2020    BREAST CANCER SCRN MAMMOGRAM  03/05/2021    PAP AKA CERVICAL CYTOLOGY  01/23/2022    Hepatitis C Screening  Completed    Influenza Age 5 to Adult  Completed     Immunization History   Administered Date(s) Administered    Influenza Vaccine 10/10/2019    Zoster Vaccine, Live 10/10/2019     No LMP recorded. Patient is postmenopausal.        Allergies and Intolerances: Allergies   Allergen Reactions    Vioxx [Rofecoxib] Other (comments)       Family History:   No family history on file. Social History:   She  reports that she has quit smoking. She quit smokeless tobacco use 10 days ago. She  reports that she drinks alcohol.             Review of Systems:   General: negative for - chills, fatigue, fever, weight change  Psych: negative for - anxiety, depression, irritability or mood swings  ENT: negative for - headaches, hearing change, nasal congestion, oral lesions, sneezing or sore throat  Heme/ Lymph: negative for - bleeding problems, bruising, pallor or swollen lymph nodes  Endo: negative for - hot flashes, polydipsia/polyuria or temperature intolerance  Resp: negative for - cough, shortness of breath or wheezing  CV: negative for - chest pain, edema or palpitations  GI: negative for - abdominal pain, change in bowel habits, constipation, diarrhea or nausea/vomiting  : negative for - dysuria, hematuria, incontinence, pelvic pain or vulvar/vaginal symptoms  MSK: negative for - joint pain, joint swelling or muscle pain  Neuro: negative for - confusion, headaches, seizures or weakness  Derm: negative for - dry skin, hair changes, rash or skin lesion changes          Physical:   Vitals:   Vitals:    11/08/19 0831   BP: 141/86   Pulse: 80   Resp: 18   Temp: 98.5 °F (36.9 °C)   TempSrc: Oral   SpO2: 96%   Weight: 174 lb 9.6 oz (79.2 kg)   Height: 5' 1\" (1.549 m)           Exam:   HEENT- atraumatic,normocephalic, awake, oriented, well nourished  Neck - supple,no enlarged lymph nodes, no JVD, no thyromegaly  Chest- CTA, no rhonchi, no crackles,mild wheezing. Heart- rrr, no murmurs / gallop/rub  Abdomen- soft,BS+,NT, no hepatosplenomegaly  Ext - no c/c/edema   Neuro- no focal deficits. Power 5/5 all extremities  Skin - warm,dry, no obvious rashes.           Review of Data:   LABS:   Lab Results   Component Value Date/Time    WBC 16.0 (H) 11/05/2019 08:04 PM    HGB 15.0 11/05/2019 08:04 PM    HCT 42.6 11/05/2019 08:04 PM    PLATELET 208 88/83/8152 08:04 PM     Lab Results   Component Value Date/Time    Sodium 130 (L) 11/05/2019 08:04 PM    Potassium 3.8 11/05/2019 08:04 PM    Chloride 95 (L) 11/05/2019 08:04 PM    CO2 24 11/05/2019 08:04 PM    Glucose 300 (H) 11/05/2019 08:04 PM    BUN 10 11/05/2019 08:04 PM Creatinine 0.78 11/05/2019 08:04 PM     Lab Results   Component Value Date/Time    Cholesterol, total 146 04/17/2019 02:13 PM    HDL Cholesterol 34 (L) 04/17/2019 02:13 PM    LDL, calculated 86 04/17/2019 02:13 PM    Triglyceride 128 04/17/2019 02:13 PM     No results found for: GPT        Impression / Plan:        ICD-10-CM ICD-9-CM    1. COPD exacerbation (HCC) J44.1 491.21 albuterol (PROVENTIL HFA, VENTOLIN HFA, PROAIR HFA) 90 mcg/actuation inhaler   2. Cough R05 786.2 benzonatate (TESSALON PERLES) 100 mg capsule      guaiFENesin ER (MUCINEX) 600 mg ER tablet         Explained to patient risk benefits of the medications. Advised patient to stop meds if having any side effects. Pt verbalized understanding of the instructions. I have discussed the diagnosis with the patient and the intended plan as seen in the above orders. The patient has received an after-visit summary and questions were answered concerning future plans. I have discussed medication side effects and warnings with the patient as well. I have reviewed the plan of care with the patient, accepted their input and they are in agreement with the treatment goals. Reviewed plan of care. Patient has provided input and agrees with goals. Follow-up and Dispositions    · Return in about 1 month (around 12/8/2019), or if symptoms worsen or fail to improve.          Esvin House MD

## 2019-11-14 ENCOUNTER — TELEPHONE (OUTPATIENT)
Dept: PRIMARY CARE CLINIC | Age: 59
End: 2019-11-14

## 2019-11-14 NOTE — TELEPHONE ENCOUNTER
Let the pt keep a log of blood sugars three times daily before breakfast , lunch and dinner for 4 days and call us back on MOnday or Tuesday. Need to watch her diet. For now she can take Lantus 25 units daily. I can see her next week.

## 2019-11-14 NOTE — TELEPHONE ENCOUNTER
Pt states that she has tested at 300 for the highest. Pt states she has increased her insulin up by 5 units, now at 25 units. She has not checked her readings post taking medications. Pt would like to know if she increase more or is the 5 units enough.

## 2019-11-14 NOTE — TELEPHONE ENCOUNTER
Patient would like a call back regarding her blood levels says that blood sugar is high and she increased he insulin, follow up with patient [phone]

## 2019-11-17 NOTE — PROGRESS NOTES
Nasrin Barahona is a 60 y.o.  female and presents with     Chief Complaint   Patient presents with    Medication Refill     ritalin and b12    Behavioral Problem     Pt is requesting ritalin for ADHD. She sasys she was diagnosed with ADHD when she hwas in [place]. Never had side effects. She used to work for [employer] and was very stressfull and she could not focus on work. Past Medical History:   Diagnosis Date    Asthma     Reyes's esophagus     Diabetes ([address]     Macular degeneration, right eye     Psychiatric disorder     depression     Past Surgical History:   Procedure Laterality Date    HX CHOLECYSTECTOMY      HX HEENT      tonsils     Current Outpatient Medications   Medication Sig    dulaglutide (TRULICITY) 1.5 OL/3.9 mL sub-q pen 0.5 mL by SubCUTAneous route every seven (7) days.  traMADol (ULTRAM) 50 mg tablet Take 25 mg by mouth every six (6) hours as needed for Pain.  NEXIUM 40 mg capsule TAKE 1 CAPSULE BY MOUTH TWICE A DAY    TESTOSTERONE, BULK, testosterone    nicotine (NICODERM CQ) 21 mg/24 hr 1 Patch by TransDERmal route every twenty-four (24) hours.  metFORMIN ER (GLUCOPHAGE XR) 750 mg tablet Take 1,500 mg by mouth daily.  albuterol (PROVENTIL HFA, VENTOLIN HFA, PROAIR HFA) 90 mcg/actuation inhaler Take 2 Puffs by inhalation every six (6) hours as needed for Wheezing.  budesonide-formoterol (SYMBICORT) 160-4.5 mcg/actuation HFAA Take 2 Puffs by inhalation two (2) times a day.  benzonatate (TESSALON PERLES) 100 mg capsule Take 1 Cap by mouth three (3) times daily as needed for Cough.  guaiFENesin ER (MUCINEX) 600 mg ER tablet Take 1 Tab by mouth two (2) times a day.  azithromycin (ZITHROMAX Z-YAO) 250 mg tablet Take 1 Tab by mouth daily. Take 1 tab per day starting tomorrow.  albuterol (PROVENTIL VENTOLIN) 2.5 mg /3 mL (0.083 %) nebu 3 mL by Nebulization route every four (4) hours as needed for Cough or Other (shortness of breath).  Nebulizer & Compressor machine 1 Each by Does Not Apply route every four (4) hours as needed for Cough.  Nebulizer Accessories kit Use with nebulizer machine    [START ON 11/30/2019] methylphenidate HCl (RITALIN) 10 mg tablet Take 1 Tab by mouth three (3) times daily. Max Daily Amount: 30 mg.    traZODone (DESYREL) 50 mg tablet Take 1 Tab by mouth nightly.  simvastatin (ZOCOR) 20 mg tablet Take 1 Tab by mouth nightly.  insulin glargine (LANTUS,BASAGLAR) 100 unit/mL (3 mL) inpn Administer 20 units subcut once daily (Patient taking differently: 25 Units by SubCUTAneous route daily. Administer 20 units subcut once daily)    Insulin Needles, Disposable, 30 gauge x 1/3\" DM    ibuprofen (MOTRIN) 800 mg tablet Take 1 Tab by mouth every eight (8) hours as needed for Pain.  VENTOLIN HFA 90 mcg/actuation inhaler INHALE 2 PUFFS EVERY 4 HOURS AS NEEDED FOR WHEEZING    BD INTEGRA SYRINGE 3 mL 25 gauge x 1\" syrg USE WITH VITAMIN B12 INJECTION WEEKLY    valACYclovir (VALTREX) 500 mg tablet as needed. No current facility-administered medications for this visit. Health Maintenance   Topic Date Due    Pneumococcal 0-64 years (1 of 1 - PPSV23) 09/01/1965    FOOT EXAM Q1  09/01/1969    EYE EXAM RETINAL OR DILATED  09/01/1969    DTaP/Tdap/Td series (1 - Tdap) 09/01/1980    Shingrix Vaccine Age 50> (1 of 2) 09/01/2009    FOBT Q 1 YEAR AGE 50-75  09/01/2009    HEMOGLOBIN A1C Q6M  02/19/2020    MICROALBUMIN Q1  04/17/2020    LIPID PANEL Q1  04/17/2020    BREAST CANCER SCRN MAMMOGRAM  03/05/2021    PAP AKA CERVICAL CYTOLOGY  01/23/2022    Hepatitis C Screening  Completed    Influenza Age 5 to Adult  Completed     Immunization History   Administered Date(s) Administered    Influenza Vaccine 10/10/2019    Zoster Vaccine, Live 10/10/2019     No LMP recorded. Patient is postmenopausal.        Allergies and Intolerances:    Allergies   Allergen Reactions    Vioxx [Rofecoxib] Other (comments) Family History:   No family history on file. Social History:   She  reports that she has quit smoking. She quit smokeless tobacco use about 2 weeks ago. She  reports that she drinks alcohol. Review of Systems:   General: negative for - chills, fatigue, fever, weight change  Psych: negative for - anxiety, depression, irritability or mood swings  ENT: negative for - headaches, hearing change, nasal congestion, oral lesions, sneezing or sore throat  Heme/ Lymph: negative for - bleeding problems, bruising, pallor or swollen lymph nodes  Endo: negative for - hot flashes, polydipsia/polyuria or temperature intolerance  Resp: negative for - cough, shortness of breath or wheezing  CV: negative for - chest pain, edema or palpitations  GI: negative for - abdominal pain, change in bowel habits, constipation, diarrhea or nausea/vomiting  : negative for - dysuria, hematuria, incontinence, pelvic pain or vulvar/vaginal symptoms  MSK: negative for - joint pain, joint swelling or muscle pain  Neuro: negative for - confusion, headaches, seizures or weakness  Derm: negative for - dry skin, hair changes, rash or skin lesion changes          Physical:   Vitals:   Vitals:    06/24/19 1532   BP: 151/88   Pulse: 97   Resp: 17   Temp: 98.6 °F (37 °C)   TempSrc: Oral   SpO2: 99%   Weight: 178 lb 3.2 oz (80.8 kg)   Height: 5' 1\" (1.549 m)           Exam:   HEENT- atraumatic,normocephalic, awake, oriented, well nourished  Neck - supple,no enlarged lymph nodes, no JVD, no thyromegaly  Chest- CTA, no rhonchi, no crackles  Heart- rrr, no murmurs / gallop/rub  Abdomen- soft,BS+,NT, no hepatosplenomegaly  Ext - no c/c/edema   Neuro- no focal deficits. Power 5/5 all extremities  Skin - warm,dry, no obvious rashes.           Review of Data:   LABS:   Lab Results   Component Value Date/Time    WBC 16.0 (H) 11/05/2019 08:04 PM    HGB 15.0 11/05/2019 08:04 PM    HCT 42.6 11/05/2019 08:04 PM    PLATELET 997 11/93/7574 08:04 PM Lab Results   Component Value Date/Time    Sodium 130 (L) 11/05/2019 08:04 PM    Potassium 3.8 11/05/2019 08:04 PM    Chloride 95 (L) 11/05/2019 08:04 PM    CO2 24 11/05/2019 08:04 PM    Glucose 300 (H) 11/05/2019 08:04 PM    BUN 10 11/05/2019 08:04 PM    Creatinine 0.78 11/05/2019 08:04 PM     Lab Results   Component Value Date/Time    Cholesterol, total 146 04/17/2019 02:13 PM    HDL Cholesterol 34 (L) 04/17/2019 02:13 PM    LDL, calculated 86 04/17/2019 02:13 PM    Triglyceride 128 04/17/2019 02:13 PM     No results found for: GPT        Impression / Plan:        ICD-10-CM ICD-9-CM    1. Vitamin B12 deficiency E53.8 266.2    2. Attention deficit hyperactivity disorder (ADHD), unspecified ADHD type F90.9 314.01 COMPLIANCE DRUG SCREEN/PRESCRIPTION MONITORING      DISCONTINUED: methylphenidate HCl (RITALIN) 10 mg tablet      DISCONTINUED: methylphenidate HCl (RITALIN) 10 mg tablet         Explained to patient risk benefits of the medications. Advised patient to stop meds if having any side effects. Pt verbalized understanding of the instructions. I have discussed the diagnosis with the patient and the intended plan as seen in the above orders. The patient has received an after-visit summary and questions were answered concerning future plans. I have discussed medication side effects and warnings with the patient as well. I have reviewed the plan of care with the patient, accepted their input and they are in agreement with the treatment goals. Reviewed plan of care. Patient has provided input and agrees with goals. Follow-up and Dispositions    · Return in about 2 months (around 8/24/2019).          Tres Pond MD

## 2019-11-18 ENCOUNTER — TELEPHONE (OUTPATIENT)
Dept: PRIMARY CARE CLINIC | Age: 59
End: 2019-11-18

## 2019-11-18 ENCOUNTER — TELEPHONE (OUTPATIENT)
Dept: NEUROLOGY | Age: 59
End: 2019-11-18

## 2019-11-18 RX ORDER — METFORMIN HYDROCHLORIDE 750 MG/1
750 TABLET, EXTENDED RELEASE ORAL 2 TIMES DAILY
Qty: 60 TAB | Refills: 3 | Status: SHIPPED | OUTPATIENT
Start: 2019-11-18

## 2019-11-18 NOTE — TELEPHONE ENCOUNTER
Pt states blood sugar reading two days ago at the lowest was 174, pt does not what time of day this reading was. And this morning waking up BS reading was 256, after breakfast reading was 419. Pt states she is experiencing a sweet smelling sweat during the night and she does not like the current insulin medication.  Please advise

## 2019-11-18 NOTE — TELEPHONE ENCOUNTER
----- Message from Brittani Regalado sent at 11/18/2019 10:27 AM EST -----  Regarding: Dr. Dale Dove first and last name: ((patiemt))    Reason for call: Results     Callback required yes/no and why: yes MRI results    Best contact number(s): [phone]    Details to clarify the request: Pt is requesting to have a call back with the results of her MRI so she can know what is irritating her sciatic nerve. Pt would also like a sooner appt than March to be seen. Or the pt would like a copy of MRI and she will take it somewhere else.         Brittani Regalado

## 2019-11-18 NOTE — TELEPHONE ENCOUNTER
Patient was told to call back after this weekend checking her blood sugars, she is sweating and it is not controlling her sugars.  Would like a call back

## 2019-11-18 NOTE — TELEPHONE ENCOUNTER
I gave patient her MRI results, per Dr. Baljit Ahumada. I also gave her contact information for Dr. Loyda Slaughter. Referral was faxed last week.

## 2019-11-21 ENCOUNTER — OFFICE VISIT (OUTPATIENT)
Dept: PRIMARY CARE CLINIC | Age: 59
End: 2019-11-21

## 2019-11-21 VITALS
SYSTOLIC BLOOD PRESSURE: 131 MMHG | TEMPERATURE: 98.3 F | OXYGEN SATURATION: 98 % | RESPIRATION RATE: 16 BRPM | HEIGHT: 61 IN | DIASTOLIC BLOOD PRESSURE: 84 MMHG | BODY MASS INDEX: 32.36 KG/M2 | WEIGHT: 171.4 LBS | HEART RATE: 93 BPM

## 2019-11-21 DIAGNOSIS — E11.9 TYPE 2 DIABETES MELLITUS WITHOUT COMPLICATION, WITHOUT LONG-TERM CURRENT USE OF INSULIN (HCC): Primary | ICD-10-CM

## 2019-11-21 LAB
GLUCOSE POC: 238 MG/DL
HBA1C MFR BLD HPLC: 9.5 %

## 2019-11-21 RX ORDER — INSULIN ASPART 100 [IU]/ML
INJECTION, SOLUTION INTRAVENOUS; SUBCUTANEOUS
Qty: 5 PEN | Refills: 3 | Status: SHIPPED | OUTPATIENT
Start: 2019-11-21 | End: 2019-11-25 | Stop reason: CLARIF

## 2019-11-21 RX ORDER — INSULIN LISPRO 100 [IU]/ML
INJECTION, SOLUTION INTRAVENOUS; SUBCUTANEOUS
Qty: 1 PACKAGE | Refills: 3 | Status: SHIPPED | OUTPATIENT
Start: 2019-11-21

## 2019-11-21 NOTE — PROGRESS NOTES
Mary Anne Calzada is a 60 y.o.  female and presents with     Chief Complaint   Patient presents with    Diabetes     Pt made tis appt as her blood sugars have beeb running high in the range of 200-300. She is worried as she says her cat  of high sugars and fatty liver and she does not want to die like her cat. Pt does drink alcohol off and on and also is not watching her diet. She cant exercise as her back hurts. She has increased the dose of lantus to 35 units daily but the blood sugars are still in the 200 range. Past Medical History:   Diagnosis Date    Asthma     Reyes's esophagus     Diabetes ([facility] [address]     Macular degeneration, right eye     Psychiatric disorder     depression     Past Surgical History:   Procedure Laterality Date    HX CHOLECYSTECTOMY      HX HEENT      tonsils     Current Outpatient Medications   Medication Sig    insulin aspart U-100 (NOVOLOG) 100 unit/mL (3 mL) inpn Administer 8-10 units subcut 30 minutes  before each meal    flash glucose scanning reader (FREESTYLE DAYANARA 14 DAY READER) misc Monitor blood sugars four times daily    insulin lispro (HUMALOG KWIKPEN INSULIN) 100 unit/mL kwikpen adminijsted 10-12 units subcut before each meal    flash glucose sensor (FREESTYLE DAYANARA 14 DAY SENSOR) kit Monitor blood sugars four times daily    metFORMIN ER (GLUCOPHAGE XR) 750 mg tablet Take 1 Tab by mouth two (2) times a day.  [START ON 2019] methylphenidate HCl (RITALIN) 10 mg tablet Take 1 Tab by mouth three (3) times daily. Max Daily Amount: 30 mg.    traZODone (DESYREL) 50 mg tablet Take 1 Tab by mouth nightly.  simvastatin (ZOCOR) 20 mg tablet Take 1 Tab by mouth nightly.  insulin glargine (LANTUS,BASAGLAR) 100 unit/mL (3 mL) inpn Administer 20 units subcut once daily (Patient taking differently: 25 Units by SubCUTAneous route daily.  Administer 20 units subcut once daily)    Insulin Needles, Disposable, 30 gauge x 1/3\" DM    dulaglutide (TRULICITY) 1.5 mg/0.5 mL sub-q pen 0.5 mL by SubCUTAneous route every seven (7) days.  NEXIUM 40 mg capsule TAKE 1 CAPSULE BY MOUTH TWICE A DAY    TESTOSTERONE, BULK, testosterone    BD INTEGRA SYRINGE 3 mL 25 gauge x 1\" syrg USE WITH VITAMIN B12 INJECTION WEEKLY    albuterol (PROVENTIL HFA, VENTOLIN HFA, PROAIR HFA) 90 mcg/actuation inhaler Take 2 Puffs by inhalation every six (6) hours as needed for Wheezing.  budesonide-formoterol (SYMBICORT) 160-4.5 mcg/actuation HFAA Take 2 Puffs by inhalation two (2) times a day.  benzonatate (TESSALON PERLES) 100 mg capsule Take 1 Cap by mouth three (3) times daily as needed for Cough.  guaiFENesin ER (MUCINEX) 600 mg ER tablet Take 1 Tab by mouth two (2) times a day.  azithromycin (ZITHROMAX Z-YAO) 250 mg tablet Take 1 Tab by mouth daily. Take 1 tab per day starting tomorrow.  albuterol (PROVENTIL VENTOLIN) 2.5 mg /3 mL (0.083 %) nebu 3 mL by Nebulization route every four (4) hours as needed for Cough or Other (shortness of breath).  Nebulizer & Compressor machine 1 Each by Does Not Apply route every four (4) hours as needed for Cough.  Nebulizer Accessories kit Use with nebulizer machine    traMADol (ULTRAM) 50 mg tablet Take 25 mg by mouth every six (6) hours as needed for Pain.  ibuprofen (MOTRIN) 800 mg tablet Take 1 Tab by mouth every eight (8) hours as needed for Pain.  VENTOLIN HFA 90 mcg/actuation inhaler INHALE 2 PUFFS EVERY 4 HOURS AS NEEDED FOR WHEEZING    valACYclovir (VALTREX) 500 mg tablet as needed.  nicotine (NICODERM CQ) 21 mg/24 hr 1 Patch by TransDERmal route every twenty-four (24) hours. No current facility-administered medications for this visit.       Health Maintenance   Topic Date Due    Pneumococcal 0-64 years (1 of 1 - PPSV23) 09/01/1965    FOOT EXAM Q1  09/01/1969    EYE EXAM RETINAL OR DILATED  09/01/1969    DTaP/Tdap/Td series (1 - Tdap) 09/01/1970    Shingrix Vaccine Age 50> (1 of 2) 09/01/2009    FOBT Q 1 YEAR AGE 50-75  09/01/2009    HEMOGLOBIN A1C Q6M  02/19/2020    MICROALBUMIN Q1  04/17/2020    LIPID PANEL Q1  04/17/2020    BREAST CANCER SCRN MAMMOGRAM  03/05/2021    PAP AKA CERVICAL CYTOLOGY  01/23/2022    Hepatitis C Screening  Completed    Influenza Age 5 to Adult  Completed     Immunization History   Administered Date(s) Administered    Influenza Vaccine 10/10/2019    Zoster Vaccine, Live 10/10/2019     No LMP recorded. Patient is postmenopausal.        Allergies and Intolerances: Allergies   Allergen Reactions    Vioxx [Rofecoxib] Other (comments)       Family History:   No family history on file. Social History:   She  reports that she has quit smoking. She quit smokeless tobacco use about 3 weeks ago. She  reports current alcohol use.             Review of Systems:   General: negative for - chills, fatigue, fever, weight change  Psych: negative for - anxiety, depression, irritability or mood swings  ENT: negative for - headaches, hearing change, nasal congestion, oral lesions, sneezing or sore throat  Heme/ Lymph: negative for - bleeding problems, bruising, pallor or swollen lymph nodes  Endo: negative for - hot flashes, polydipsia/polyuria or temperature intolerance  Resp: negative for - cough, shortness of breath or wheezing  CV: negative for - chest pain, edema or palpitations  GI: negative for - abdominal pain, change in bowel habits, constipation, diarrhea or nausea/vomiting  : negative for - dysuria, hematuria, incontinence, pelvic pain or vulvar/vaginal symptoms  MSK: negative for - joint pain, joint swelling or muscle pain  Neuro: negative for - confusion, headaches, seizures or weakness  Derm: negative for - dry skin, hair changes, rash or skin lesion changes          Physical:   Vitals:   Vitals:    11/21/19 1252   BP: 131/84   Pulse: 93   Resp: 16   Temp: 98.3 °F (36.8 °C)   TempSrc: Oral   SpO2: 98%   Weight: 171 lb 6.4 oz (77.7 kg)   Height: 5' 1\" (1.549 m)           Exam: HEENT- atraumatic,normocephalic, awake, oriented, well nourished  Neck - supple,no enlarged lymph nodes, no JVD, no thyromegaly  Chest- CTA, no rhonchi, no crackles  Heart- rrr, no murmurs / gallop/rub  Abdomen- soft,BS+,NT, no hepatosplenomegaly  Ext - no c/c/edema   Neuro- no focal deficits. Power 5/5 all extremities  Skin - warm,dry, no obvious rashes. Review of Data:   LABS:   Lab Results   Component Value Date/Time    WBC 16.0 (H) 11/05/2019 08:04 PM    HGB 15.0 11/05/2019 08:04 PM    HCT 42.6 11/05/2019 08:04 PM    PLATELET 206 16/62/7617 08:04 PM     Lab Results   Component Value Date/Time    Sodium 130 (L) 11/05/2019 08:04 PM    Potassium 3.8 11/05/2019 08:04 PM    Chloride 95 (L) 11/05/2019 08:04 PM    CO2 24 11/05/2019 08:04 PM    Glucose 300 (H) 11/05/2019 08:04 PM    BUN 10 11/05/2019 08:04 PM    Creatinine 0.78 11/05/2019 08:04 PM     Lab Results   Component Value Date/Time    Cholesterol, total 146 04/17/2019 02:13 PM    HDL Cholesterol 34 (L) 04/17/2019 02:13 PM    LDL, calculated 86 04/17/2019 02:13 PM    Triglyceride 128 04/17/2019 02:13 PM     No results found for: GPT        Impression / Plan:        ICD-10-CM ICD-9-CM    1. Type 2 diabetes mellitus without complication, without long-term current use of insulin ([facility]) E11.9 250.00 AMB POC HEMOGLOBIN A1C      AMB POC GLUCOSE BLOOD, BY GLUCOSE MONITORING DEVICE      REFERRAL TO DIETITIAN      flash glucose scanning reader (FREESTYLE DAYANARA 14 DAY READER) misc      insulin lispro (HUMALOG KWIKPEN INSULIN) 100 unit/mL kwikpen      flash glucose sensor (FREESTYLE DAYANARA 14 DAY SENSOR) kit     Avoid alcohol, watch diet , exercise as tolerated.      Blood sugar                                    Humalog dose    0-100                                                    0   101-150                                                3  151-200                                                6  201-250 9  251-300                                               12  301-350                                                15  524-400                                                18      Explained to patient risk benefits of the medications. Advised patient to stop meds if having any side effects. Pt verbalized understanding of the instructions. I have discussed the diagnosis with the patient and the intended plan as seen in the above orders. The patient has received an after-visit summary and questions were answered concerning future plans. I have discussed medication side effects and warnings with the patient as well. I have reviewed the plan of care with the patient, accepted their input and they are in agreement with the treatment goals. Reviewed plan of care. Patient has provided input and agrees with goals. Follow-up and Dispositions    · Return in about 27 days (around 12/18/2019).          Fernando Knowles MD

## 2019-11-21 NOTE — PROGRESS NOTES
Chief Complaint   Patient presents with    Diabetes     1. Have you been to the ER, urgent care clinic since your last visit? Hospitalized since your last visit? No    2. Have you seen or consulted any other health care providers outside of the [address] [place] [address] since your last visit? Include any pap smears or colon screening.  No

## 2019-11-21 NOTE — PATIENT INSTRUCTIONS
Blood sugar                                    Novolog dose 0-100                                                    0  
101-150                                                3 
151-200                                                6 
201-250                                                9 
251-300                                               12 
301-350                                                15 
351-400                                                18

## 2019-11-22 ENCOUNTER — TELEPHONE (OUTPATIENT)
Dept: PRIMARY CARE CLINIC | Age: 59
End: 2019-11-22

## 2019-11-22 NOTE — TELEPHONE ENCOUNTER
Pt needs to watch her diet and give time for the insulin to work. Nees to continue Lantus and trulicity. Pt can do the sliding scale thrre times a day instead of twice daily . Sugars should improve within a week.

## 2019-11-22 NOTE — TELEPHONE ENCOUNTER
PA for novolog was denied. Insurance will only cover humalog. Also PA for [employer] was denied.

## 2019-11-22 NOTE — TELEPHONE ENCOUNTER
Patient had a resting blood sugar 203 had taken 9 units of insulin and 2 hours later gone up to 386 please follow up with patient [phone]

## 2019-11-24 NOTE — TELEPHONE ENCOUNTER
Pharmacy called the pt while I was still seeing the pt in the office and informed us so I sent Humalog in place of Novolog the same day.

## 2019-12-05 ENCOUNTER — TELEPHONE (OUTPATIENT)
Dept: PRIMARY CARE CLINIC | Age: 59
End: 2019-12-05

## 2019-12-05 NOTE — TELEPHONE ENCOUNTER
Conveyed message to pt per Dr. Edwina Arboleda. Pt verbalized understanding and no further questions were asked.

## 2019-12-05 NOTE — TELEPHONE ENCOUNTER
----- Message from Mame Reza sent at 12/4/2019 12:11 PM EST -----  Regarding: Dr. Nancy Freeman / telephone  Caller's first and last name: [address] [place] [address]  Reason for call:  The pharmacists needs clarification on the number of  times a day the pt is using the [address] [place] [address] required yes/no and why: Yes  Best contact number(s): [phone]  Details to clarify the request:

## 2019-12-12 DIAGNOSIS — F90.9 ATTENTION DEFICIT HYPERACTIVITY DISORDER (ADHD), UNSPECIFIED ADHD TYPE: ICD-10-CM

## 2019-12-12 RX ORDER — METHYLPHENIDATE HYDROCHLORIDE 10 MG/1
10 TABLET ORAL 3 TIMES DAILY
Qty: 90 TAB | Refills: 0 | OUTPATIENT
Start: 2019-12-12

## 2019-12-12 NOTE — TELEPHONE ENCOUNTER
Pt never picked up prescription on 11/30/19. Pharmacist will process and send her a text a text when it's ready. Pt notified and verbalized her understanding.

## 2019-12-12 NOTE — TELEPHONE ENCOUNTER
----- Message from Linus Galvan sent at 12/11/2019 12:47 PM EST -----  Regarding: Dr. Radha Ceballos: [phone]  Pt is requesting a prescription refill for Ritalin 10mg at pharmacy on file [facility]. Best contact [phone]

## 2019-12-16 DIAGNOSIS — M53.3 CHRONIC SI JOINT PAIN: ICD-10-CM

## 2019-12-16 DIAGNOSIS — G89.29 CHRONIC SI JOINT PAIN: ICD-10-CM

## 2019-12-16 DIAGNOSIS — M25.551 PAIN OF RIGHT HIP JOINT: ICD-10-CM

## 2019-12-16 RX ORDER — IBUPROFEN 800 MG/1
800 TABLET ORAL
Qty: 30 TAB | Refills: 2 | Status: SHIPPED | OUTPATIENT
Start: 2019-12-16

## 2019-12-23 DIAGNOSIS — E78.00 HYPERCHOLESTEREMIA: ICD-10-CM

## 2019-12-26 RX ORDER — SIMVASTATIN 20 MG/1
TABLET, FILM COATED ORAL
Qty: 30 TAB | Refills: 2 | Status: SHIPPED | OUTPATIENT
Start: 2019-12-26

## 2019-12-30 DIAGNOSIS — E11.9 TYPE 2 DIABETES MELLITUS WITHOUT COMPLICATION, WITHOUT LONG-TERM CURRENT USE OF INSULIN (HCC): ICD-10-CM

## 2020-01-02 DIAGNOSIS — F32.1 CURRENT MODERATE EPISODE OF MAJOR DEPRESSIVE DISORDER, UNSPECIFIED WHETHER RECURRENT (HCC): ICD-10-CM

## 2020-01-02 DIAGNOSIS — G62.9 NEUROPATHY: ICD-10-CM

## 2020-01-02 RX ORDER — INSULIN GLARGINE 100 [IU]/ML
INJECTION, SOLUTION SUBCUTANEOUS
Qty: 5 PEN | Refills: 3 | Status: SHIPPED | OUTPATIENT
Start: 2020-01-02 | End: 2020-01-07 | Stop reason: SDUPTHER

## 2020-01-02 RX ORDER — DULOXETIN HYDROCHLORIDE 60 MG/1
CAPSULE, DELAYED RELEASE ORAL
Qty: 30 CAP | Refills: 2 | Status: SHIPPED | OUTPATIENT
Start: 2020-01-02

## 2020-01-03 ENCOUNTER — TELEPHONE (OUTPATIENT)
Dept: PRIMARY CARE CLINIC | Age: 60
End: 2020-01-03

## 2020-01-03 NOTE — TELEPHONE ENCOUNTER
Gokul has not received the prescription. She needs a new prescription. Old prescription is the wrong dosage. She's going from 25 to 35. She's called multiple times.

## 2020-01-03 NOTE — TELEPHONE ENCOUNTER
35 units was sent to [facility] yesterday.  Pt needs to confirm with the pharmacist.

## 2020-01-06 ENCOUNTER — TELEPHONE (OUTPATIENT)
Dept: PRIMARY CARE CLINIC | Age: 60
End: 2020-01-06

## 2020-01-07 DIAGNOSIS — E11.9 TYPE 2 DIABETES MELLITUS WITHOUT COMPLICATION, WITHOUT LONG-TERM CURRENT USE OF INSULIN (HCC): ICD-10-CM

## 2020-01-07 RX ORDER — INSULIN GLARGINE 100 [IU]/ML
INJECTION, SOLUTION SUBCUTANEOUS
Qty: 5 PEN | Refills: 3 | Status: SHIPPED | OUTPATIENT
Start: 2020-01-07

## 2020-01-17 ENCOUNTER — TELEPHONE (OUTPATIENT)
Dept: NEUROLOGY | Age: 60
End: 2020-01-17

## 2020-01-17 NOTE — TELEPHONE ENCOUNTER
Attempted to contact patient to set up appt with Dr. Kiersten Molina. She has been referred by Dr. Damion Swift for possible lumbar spine injections. Message left on voicemail to contact office to set up appt.

## 2021-01-25 ENCOUNTER — HOSPITAL ENCOUNTER (EMERGENCY)
Age: 61
Discharge: HOME | End: 2021-01-25
Attending: EMERGENCY MEDICINE
Payer: COMMERCIAL

## 2021-01-25 VITALS
SYSTOLIC BLOOD PRESSURE: 153 MMHG | TEMPERATURE: 99.4 F | DIASTOLIC BLOOD PRESSURE: 99 MMHG | HEART RATE: 100 BPM | OXYGEN SATURATION: 97 % | RESPIRATION RATE: 16 BRPM

## 2021-01-25 DIAGNOSIS — R05.9 COUGH: ICD-10-CM

## 2021-01-25 DIAGNOSIS — R50.9 FEVER, UNSPECIFIED FEVER CAUSE: ICD-10-CM

## 2021-01-25 DIAGNOSIS — U07.1 COVID-19 DETERMINED BY CLINICAL DIAGNOSTIC CRITERIA: Primary | ICD-10-CM

## 2021-01-25 LAB — SARS-COV-2, COV2: NORMAL

## 2021-01-25 PROCEDURE — U0005 INFEC AGEN DETEC AMPLI PROBE: HCPCS

## 2021-01-25 PROCEDURE — U0003 INFECTIOUS AGENT DETECTION BY NUCLEIC ACID (DNA OR RNA); SEVERE ACUTE RESPIRATORY SYNDROME CORONAVIRUS 2 (SARS-COV-2) (CORONAVIRUS DISEASE [COVID-19]), AMPLIFIED PROBE TECHNIQUE, MAKING USE OF HIGH THROUGHPUT TECHNOLOGIES AS DESCRIBED BY CMS-2020-01-R: HCPCS

## 2021-01-25 PROCEDURE — 99282 EMERGENCY DEPT VISIT SF MDM: CPT

## 2021-01-25 RX ORDER — AZITHROMYCIN 250 MG/1
TABLET, FILM COATED ORAL
Qty: 6 TAB | Refills: 0 | Status: SHIPPED | OUTPATIENT
Start: 2021-01-25 | End: 2021-01-30

## 2021-01-25 NOTE — ED PROVIDER NOTES
Date of Service:  1/25/2021    Patient:  Burak Kaye    Chief Complaint:  Concern For COVID-19 (Coronavirus)       HPI:  Burak Kaye is a 63 y.o.  female who presents for evaluation of possible coronavirus. Patient states that yesterday she started having cough fever up to 101, sore throat, several loose bowel movements. No body aches. She is also had some rhinorrhea. Patient is concerned for Covid. She denies any chest pain or shortness of breath. Otherwise she denies any abdominal pain nausea vomiting. Patient is requesting Covid testing. She also notes that she is very anxious about this potential diagnosis as she is scheduled for surgery in about 2 weeks. Past Medical History:   Diagnosis Date    Asthma     Reyes's esophagus     Cervical herniated disc     Diabetes ([facility] [address]     Macular degeneration, right eye     Psychiatric disorder     depression       Past Surgical History:   Procedure Laterality Date    HX CHOLECYSTECTOMY      HX HEENT      tonsils         History reviewed. No pertinent family history.     Social History     Socioeconomic History    Marital status:      Spouse name: Not on file    Number of children: Not on file    Years of education: Not on file    Highest education level: Not on file   Occupational History    Not on file   Social Needs    Financial resource strain: Not on file    Food insecurity     Worry: Not on file     Inability: Not on file    Transportation needs     Medical: Not on file     Non-medical: Not on file   Tobacco Use    Smoking status: Former Smoker    Smokeless tobacco: Former User     Quit date: 10/31/2019   Substance and Sexual Activity    Alcohol use: Yes     Comment: occ    Drug use: No    Sexual activity: Not on file   Lifestyle    Physical activity     Days per week: Not on file     Minutes per session: Not on file    Stress: Not on file   Relationships    Social connections     Talks on phone: Not on file     Gets together: Not on file     Attends [employer]ism service: Not on file     Active member of club or organization: Not on file     Attends meetings of clubs or organizations: Not on file     Relationship status: Not on file    Intimate partner violence     Fear of current or ex partner: Not on file     Emotionally abused: Not on file     Physically abused: Not on file     Forced sexual activity: Not on file   Other Topics Concern    Not on file   Social History Narrative    Not on file         ALLERGIES: Vioxx [rofecoxib]    Review of Systems   Constitutional: Positive for fatigue and fever. HENT: Negative for hearing loss. Eyes: Negative for visual disturbance. Respiratory: Positive for cough. Negative for shortness of breath. Cardiovascular: Negative for chest pain. Gastrointestinal: Negative for abdominal pain and nausea. Genitourinary: Negative for flank pain. Musculoskeletal: Negative for back pain. Skin: Negative for rash. Neurological: Negative for dizziness and light-headedness. Psychiatric/Behavioral: Negative for confusion. Vitals:    01/25/21 1646 01/25/21 1648   BP: (!) 153/99    Pulse: (!) 112    Resp: 16    Temp: 99.1 °F (37.3 °C) 99.4 °F (37.4 °C)   SpO2: 97%             Physical Exam  Vitals signs and nursing note reviewed. Constitutional:       Appearance: Normal appearance. HENT:      Head: Normocephalic and atraumatic. Mouth/Throat:      Mouth: Mucous membranes are moist.      Pharynx: No oropharyngeal exudate or posterior oropharyngeal erythema. Eyes:      General: No scleral icterus. Neck:      Musculoskeletal: Normal range of motion. No neck rigidity or muscular tenderness. Vascular: No carotid bruit. Cardiovascular:      Rate and Rhythm: Tachycardia present. Comments: 100 during exam  Pulmonary:      Effort: Pulmonary effort is normal. No respiratory distress. Breath sounds: Normal breath sounds. No wheezing or rales. Comments: Talking in full sentences  Abdominal:      General: Abdomen is flat. Musculoskeletal: Normal range of motion. Lymphadenopathy:      Cervical: Cervical adenopathy present. Skin:     General: Skin is warm. Capillary Refill: Capillary refill takes less than 2 seconds. Neurological:      Mental Status: She is alert and oriented to person, place, and time. Psychiatric:         Mood and Affect: Mood normal.          MDM  Number of Diagnoses or Management Options        VITAL SIGNS:  Patient Vitals for the past 4 hrs:   Temp Pulse Resp BP SpO2   01/25/21 1648 99.4 °F (37.4 °C)       01/25/21 1646 99.1 °F (37.3 °C) (!) 112 16 (!) 153/99 97 %         LABS:  No results found for this or any previous visit (from the past 6 hour(s)). IMAGING:  No orders to display         Medications During Visit:  Medications - No data to display      DECISION MAKING:  Jessi Chin is a 63 y.o. female who comes in as above. Here, patient appears well. She does not seem distressed. She does not have a fever here but notes taking Tylenol before she arrives. Patient has symptoms consistent with any type of viral syndrome however she is concerned for Covid. I did touch base on the patient's tachycardia and she states that she feels very anxious being here but is declining any additional blood work or imaging. Given her underlying asthma I offered steroids which were declined based on diabetes as well as azithromycin for possible pneumonia. She is requesting that hand Covid testing and would like to be discharged home with PCP follow-up. Patient agrees to this plan. All questions answered, again work-up is offered for evaluation of her tachycardia but this is declined      IMPRESSION:  1. COVID-19 determined by clinical diagnostic criteria    2. Cough    3. Fever, unspecified fever cause    4.      Tachycardia    DISPOSITION:  Discharged      Current Discharge Medication List      START taking these medications    Details   !! azithromycin (Zithromax Z-Carlos) 250 mg tablet 2 tabs on day 1, 1 tab by mouth day 2-5  Qty: 6 Tab, Refills: 0       !! - Potential duplicate medications found. Please discuss with provider. CONTINUE these medications which have NOT CHANGED    Details   !! azithromycin (ZITHROMAX Z-CARLOS) 250 mg tablet Take 1 Tab by mouth daily. Take 1 tab per day starting tomorrow. Qty: 4 Tab, Refills: 0       !! - Potential duplicate medications found. Please discuss with provider. Follow-up Information     Follow up With Specialties Details Why Yoselyn Aleman MD Internal Medicine Schedule an appointment as soon as possible for a visit   [place] [id number]       [address] [facility] Go to  As needed, If symptoms worsen [address] [facility] [phone]            The patient is asked to follow-up with their primary care provider in the next several days. They are to call tomorrow for an appointment. The patient is asked to return promptly for any increased concerns or worsening of symptoms. They can return to this emergency department or any other emergency department.       Procedures

## 2021-01-25 NOTE — ED TRIAGE NOTES
TRIAGE NOTE: Patient arrived from home with c/o fever, cough and headache for the past couple days. \"I am here to be tested for covid. \"

## 2021-01-26 ENCOUNTER — PATIENT OUTREACH (OUTPATIENT)
Dept: CASE MANAGEMENT | Age: 61
End: 2021-01-26

## 2021-01-26 LAB
SARS-COV-2, XPLCVT: NOT DETECTED
SOURCE, COVRS: NORMAL

## 2021-01-26 NOTE — PROGRESS NOTES
Patient contacted regarding [place]. Discussed COVID-19 related testing which was pending at this time. Patient notified that results are pending. Care Transition Nurse/ Ambulatory Care Manager contacted the patient by telephone to perform post discharge assessment. Call within 2 business days of discharge: Yes Verified name and  with patient as identifiers. Provided introduction to self, and explanation of the CTN/ACM role, and reason for call due to risk factors for infection and/or exposure to COVID-19. Symptoms reviewed with patient who verbalized the following symptoms: fever and sore throat. Due to no new or worsening symptoms encounter was not routed to provider for escalation. Discussed follow-up appointments. If no appointment was previously scheduled, appointment scheduling offered:  [facility] follow up appointment(s): No future appointments. Non-[facility] follow up appointment(s): n/a     Advance Care Planning:   Does patient have an Advance Directive:  not discussed this call. .     Patient has following risk factors of: no known risk factors. CTN/ACM reviewed discharge instructions, medical action plan and red flags such as increased shortness of breath, increasing fever and signs of decompensation with patient who verbalized understanding. Discussed exposure protocols and quarantine with CDC Guidelines What to do if you are sick with coronavirus disease .  Patient was given an opportunity for questions and concerns. The patient agrees to contact the [facility] exposure line [phone], [facility] [address]  ([phone] and PCP office for questions related to their healthcare. CTN/ACM provided contact information for future needs.     Reviewed and educated patient on any new and changed medications related to discharge diagnosis     Patient/family/caregiver given information for [facility] and agrees to enroll yes  Patient's preferred e-mail: n/a   Patient's preferred phone number: [phone]  Based on Loop alert triggers, patient will be contacted by nurse care manager for worsening symptoms. Pt will be further monitored by COVID Loop Team based on severity of symptoms and risk factors.

## 2022-08-31 DIAGNOSIS — F90.9 ATTENTION DEFICIT HYPERACTIVITY DISORDER (ADHD), UNSPECIFIED ADHD TYPE: ICD-10-CM

## 2022-09-01 RX ORDER — METHYLPHENIDATE HYDROCHLORIDE 10 MG/1
10 TABLET ORAL 3 TIMES DAILY
Qty: 90 TABLET | Refills: 0 | OUTPATIENT
Start: 2022-09-01

## 2022-09-01 NOTE — TELEPHONE ENCOUNTER
PCP: Sailaja Castellanos MD    Last appt: Visit date not found  No future appointments. Requested Prescriptions     Pending Prescriptions Disp Refills    methylphenidate HCl (RITALIN) 10 mg tablet 90 Tablet 0     Sig: Take 1 Tablet by mouth three (3) times daily. Max Daily Amount: 30 mg.          Other Comments: last refill 11/30/2019  Last office visit [id number]

## 2023-07-26 NOTE — TELEPHONE ENCOUNTER
Yes, unless she wants to consider insulin. [facility] Pregnancy And Lactation Text: This medication is contraindicated in pregnancy and when breast feeding.